# Patient Record
Sex: MALE | Race: WHITE | NOT HISPANIC OR LATINO | Employment: FULL TIME | URBAN - NONMETROPOLITAN AREA
[De-identification: names, ages, dates, MRNs, and addresses within clinical notes are randomized per-mention and may not be internally consistent; named-entity substitution may affect disease eponyms.]

---

## 2023-06-13 PROBLEM — M17.12 ARTHRITIS OF KNEE, LEFT: Status: RESOLVED | Noted: 2023-06-13 | Resolved: 2023-06-13

## 2023-06-13 PROBLEM — I10 HYPERTENSION: Status: ACTIVE | Noted: 2023-06-13
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
## 2023-06-13 PROBLEM — H40.9 GLAUCOMA: Status: RESOLVED | Noted: 2023-06-13 | Resolved: 2023-06-13

## 2023-06-13 PROBLEM — E78.5 HYPERLIPIDEMIA: Status: ACTIVE | Noted: 2023-06-13

## 2023-06-13 RX ORDER — BENAZEPRIL HYDROCHLORIDE 40 MG/1
1 TABLET ORAL DAILY
COMMUNITY
Start: 2013-11-04 | End: 2023-07-27 | Stop reason: SDUPTHER

## 2023-06-13 RX ORDER — AMLODIPINE BESYLATE 10 MG/1
1 TABLET ORAL DAILY
COMMUNITY
Start: 2013-11-04 | End: 2023-07-27 | Stop reason: SDUPTHER

## 2023-07-27 DIAGNOSIS — I10 HYPERTENSION, UNSPECIFIED TYPE: ICD-10-CM

## 2023-07-27 RX ORDER — BENAZEPRIL HYDROCHLORIDE 40 MG/1
40 TABLET ORAL DAILY
Qty: 90 TABLET | Refills: 1 | Status: SHIPPED | OUTPATIENT
Start: 2023-07-27 | End: 2023-08-01 | Stop reason: SDUPTHER

## 2023-07-27 RX ORDER — AMLODIPINE BESYLATE 10 MG/1
10 TABLET ORAL DAILY
Qty: 90 TABLET | Refills: 1 | Status: SHIPPED | OUTPATIENT
Start: 2023-07-27 | End: 2023-08-01 | Stop reason: SDUPTHER

## 2023-08-01 DIAGNOSIS — I10 HYPERTENSION, UNSPECIFIED TYPE: ICD-10-CM

## 2023-08-01 RX ORDER — AMLODIPINE BESYLATE 10 MG/1
10 TABLET ORAL DAILY
Qty: 90 TABLET | Refills: 1 | Status: SHIPPED | OUTPATIENT
Start: 2023-08-01 | End: 2023-08-15 | Stop reason: SDUPTHER

## 2023-08-01 RX ORDER — BENAZEPRIL HYDROCHLORIDE 40 MG/1
40 TABLET ORAL DAILY
Qty: 90 TABLET | Refills: 1 | Status: SHIPPED | OUTPATIENT
Start: 2023-08-01 | End: 2023-08-15 | Stop reason: SDUPTHER

## 2023-08-15 DIAGNOSIS — I10 HYPERTENSION, UNSPECIFIED TYPE: ICD-10-CM

## 2023-08-15 RX ORDER — BENAZEPRIL HYDROCHLORIDE 40 MG/1
40 TABLET ORAL DAILY
Qty: 90 TABLET | Refills: 0 | Status: SHIPPED | OUTPATIENT
Start: 2023-08-15 | End: 2023-10-10

## 2023-08-15 RX ORDER — AMLODIPINE BESYLATE 10 MG/1
10 TABLET ORAL DAILY
Qty: 90 TABLET | Refills: 0 | Status: SHIPPED | OUTPATIENT
Start: 2023-08-15 | End: 2023-10-10

## 2023-09-12 ENCOUNTER — OFFICE VISIT (OUTPATIENT)
Dept: PRIMARY CARE | Facility: CLINIC | Age: 68
End: 2023-09-12
Payer: MEDICARE

## 2023-09-12 VITALS
DIASTOLIC BLOOD PRESSURE: 70 MMHG | HEART RATE: 87 BPM | WEIGHT: 207 LBS | HEIGHT: 71 IN | OXYGEN SATURATION: 94 % | BODY MASS INDEX: 28.98 KG/M2 | SYSTOLIC BLOOD PRESSURE: 110 MMHG | TEMPERATURE: 97.2 F

## 2023-09-12 DIAGNOSIS — Z12.5 SCREENING FOR PROSTATE CANCER: ICD-10-CM

## 2023-09-12 DIAGNOSIS — Z00.00 MEDICARE ANNUAL WELLNESS VISIT, SUBSEQUENT: Primary | ICD-10-CM

## 2023-09-12 DIAGNOSIS — I10 HYPERTENSION, UNSPECIFIED TYPE: ICD-10-CM

## 2023-09-12 DIAGNOSIS — Z23 IMMUNIZATION DUE: ICD-10-CM

## 2023-09-12 DIAGNOSIS — E78.5 HYPERLIPIDEMIA, UNSPECIFIED HYPERLIPIDEMIA TYPE: ICD-10-CM

## 2023-09-12 DIAGNOSIS — Z12.11 SPECIAL SCREENING FOR MALIGNANT NEOPLASM OF COLON: ICD-10-CM

## 2023-09-12 PROCEDURE — G0008 ADMIN INFLUENZA VIRUS VAC: HCPCS | Performed by: FAMILY MEDICINE

## 2023-09-12 PROCEDURE — 1159F MED LIST DOCD IN RCRD: CPT | Performed by: FAMILY MEDICINE

## 2023-09-12 PROCEDURE — 3074F SYST BP LT 130 MM HG: CPT | Performed by: FAMILY MEDICINE

## 2023-09-12 PROCEDURE — 3008F BODY MASS INDEX DOCD: CPT | Performed by: FAMILY MEDICINE

## 2023-09-12 PROCEDURE — 1170F FXNL STATUS ASSESSED: CPT | Performed by: FAMILY MEDICINE

## 2023-09-12 PROCEDURE — G0439 PPPS, SUBSEQ VISIT: HCPCS | Performed by: FAMILY MEDICINE

## 2023-09-12 PROCEDURE — 1160F RVW MEDS BY RX/DR IN RCRD: CPT | Performed by: FAMILY MEDICINE

## 2023-09-12 PROCEDURE — 1036F TOBACCO NON-USER: CPT | Performed by: FAMILY MEDICINE

## 2023-09-12 PROCEDURE — 3078F DIAST BP <80 MM HG: CPT | Performed by: FAMILY MEDICINE

## 2023-09-12 PROCEDURE — 90662 IIV NO PRSV INCREASED AG IM: CPT | Performed by: FAMILY MEDICINE

## 2023-09-12 ASSESSMENT — ACTIVITIES OF DAILY LIVING (ADL)
TAKING_MEDICATION: INDEPENDENT
DOING_HOUSEWORK: INDEPENDENT
BATHING: INDEPENDENT
MANAGING_FINANCES: INDEPENDENT
GROCERY_SHOPPING: INDEPENDENT
DRESSING: INDEPENDENT

## 2023-09-12 ASSESSMENT — PATIENT HEALTH QUESTIONNAIRE - PHQ9
2. FEELING DOWN, DEPRESSED OR HOPELESS: NOT AT ALL
SUM OF ALL RESPONSES TO PHQ9 QUESTIONS 1 AND 2: 0
1. LITTLE INTEREST OR PLEASURE IN DOING THINGS: NOT AT ALL

## 2023-09-12 ASSESSMENT — ENCOUNTER SYMPTOMS
OCCASIONAL FEELINGS OF UNSTEADINESS: 0
DEPRESSION: 0
LOSS OF SENSATION IN FEET: 0

## 2023-09-12 NOTE — PROGRESS NOTES
"Subjective   Reason for Visit: Edward Andrade is an 68 y.o. male here for a Medicare Wellness visit.     Past Medical, Surgical, and Family History reviewed and updated in chart.    Reviewed all medications by prescribing practitioner or clinical pharmacist (such as prescriptions, OTCs, herbal therapies and supplements) and documented in the medical record.    HPI  Here for medicare annual visit  HTN under control with meds  Has not yet gotten his blood work done  Had FIT test done last year. Discussed cologuard      Patient Care Team:  Nicole Coronado MD as PCP - General  Nicole Coronado MD as PCP - [employer] PCP     Review of Systems  General: no fever  Eyes: no blurry vision  ENT: no sore throat, no ear pain  Resp: no cough, sob or wheezing  Cardio: no chest pain, no palpitations  Abd: no nausea/vomiting  : no dysuria, no increased urinary frequency      Objective   Vitals:  /70   Pulse 87   Temp 36.2 °C (97.2 °F)   Ht 1.803 m (5' 11\")   Wt 93.9 kg (207 lb)   SpO2 94%   BMI 28.87 kg/m²       Physical Exam  Gen: NAD, alert  Head: normocephalic/atraumatic  Eyes: conjunctivae normal  Ears: canals clear bilaterally, TM normal   Nose: external nose normal   Oropharynx: clear   Resp: Clear to auscultation  CVS: Regular rate and rhythm  Abdomen: soft, NT, ND  Ext: no edema, NT of lower extremities  Neuro: gait normal     Assessment/Plan   Problem List Items Addressed This Visit       Hyperlipidemia    Relevant Orders    Lipid Panel    TSH with reflex to Free T4 if abnormal    CBC    Comprehensive Metabolic Panel    CT cardiac scoring wo IV contrast    Hypertension    Relevant Orders    CT cardiac scoring wo IV contrast     Other Visit Diagnoses       Medicare annual wellness visit, subsequent    -  Primary    BMI 28.0-28.9,adult        Screening for prostate cancer        Relevant Orders    Prostate Specific Antigen, Screen    Immunization due        Relevant Orders "    Flu vaccine, quadrivalent, high-dose, preservative free, age 65y+ (FLUZONE) (Completed)    Special screening for malignant neoplasm of colon        Relevant Orders    Cologuard® colon cancer screening

## 2023-09-12 NOTE — PATIENT INSTRUCTIONS
Please follow up in 6 months  Please take your medications as prescribed  Please get your blood work and CT cardiac scoring done  Please call the number that was given to you for the cologuard company to make sure it's covered by your insurance and if it is please do complete it

## 2023-09-27 ENCOUNTER — LAB (OUTPATIENT)
Dept: LAB | Facility: LAB | Age: 68
End: 2023-09-27
Payer: MEDICARE

## 2023-09-27 DIAGNOSIS — E78.5 HYPERLIPIDEMIA, UNSPECIFIED HYPERLIPIDEMIA TYPE: ICD-10-CM

## 2023-09-27 DIAGNOSIS — Z12.5 SCREENING FOR PROSTATE CANCER: ICD-10-CM

## 2023-09-27 LAB
ALANINE AMINOTRANSFERASE (SGPT) (U/L) IN SER/PLAS: 19 U/L (ref 10–52)
ALBUMIN (G/DL) IN SER/PLAS: 3.9 G/DL (ref 3.4–5)
ALKALINE PHOSPHATASE (U/L) IN SER/PLAS: 59 U/L (ref 33–136)
ANION GAP IN SER/PLAS: 14 MMOL/L (ref 10–20)
ASPARTATE AMINOTRANSFERASE (SGOT) (U/L) IN SER/PLAS: 15 U/L (ref 9–39)
BILIRUBIN TOTAL (MG/DL) IN SER/PLAS: 0.5 MG/DL (ref 0–1.2)
CALCIUM (MG/DL) IN SER/PLAS: 9 MG/DL (ref 8.6–10.3)
CARBON DIOXIDE, TOTAL (MMOL/L) IN SER/PLAS: 27 MMOL/L (ref 21–32)
CHLORIDE (MMOL/L) IN SER/PLAS: 103 MMOL/L (ref 98–107)
CHOLESTEROL (MG/DL) IN SER/PLAS: 235 MG/DL (ref 0–199)
CHOLESTEROL IN HDL (MG/DL) IN SER/PLAS: 76.4 MG/DL
CHOLESTEROL/HDL RATIO: 3.1
CREATININE (MG/DL) IN SER/PLAS: 0.89 MG/DL (ref 0.5–1.3)
ERYTHROCYTE DISTRIBUTION WIDTH (RATIO) BY AUTOMATED COUNT: 13 % (ref 11.5–14.5)
ERYTHROCYTE MEAN CORPUSCULAR HEMOGLOBIN CONCENTRATION (G/DL) BY AUTOMATED: 32.6 G/DL (ref 32–36)
ERYTHROCYTE MEAN CORPUSCULAR VOLUME (FL) BY AUTOMATED COUNT: 100 FL (ref 80–100)
ERYTHROCYTES (10*6/UL) IN BLOOD BY AUTOMATED COUNT: 4.9 X10E12/L (ref 4.5–5.9)
GFR MALE: >90 ML/MIN/1.73M2
GLUCOSE (MG/DL) IN SER/PLAS: 92 MG/DL (ref 74–99)
HEMATOCRIT (%) IN BLOOD BY AUTOMATED COUNT: 48.8 % (ref 41–52)
HEMOGLOBIN (G/DL) IN BLOOD: 15.9 G/DL (ref 13.5–17.5)
LDL: 147 MG/DL (ref 0–99)
LEUKOCYTES (10*3/UL) IN BLOOD BY AUTOMATED COUNT: 6.9 X10E9/L (ref 4.4–11.3)
PLATELETS (10*3/UL) IN BLOOD AUTOMATED COUNT: 339 X10E9/L (ref 150–450)
POTASSIUM (MMOL/L) IN SER/PLAS: 4.5 MMOL/L (ref 3.5–5.3)
PROTEIN TOTAL: 7.4 G/DL (ref 6.4–8.2)
SODIUM (MMOL/L) IN SER/PLAS: 139 MMOL/L (ref 136–145)
THYROTROPIN (MIU/L) IN SER/PLAS BY DETECTION LIMIT <= 0.05 MIU/L: 2.09 MIU/L (ref 0.44–3.98)
TRIGLYCERIDE (MG/DL) IN SER/PLAS: 57 MG/DL (ref 0–149)
UREA NITROGEN (MG/DL) IN SER/PLAS: 13 MG/DL (ref 6–23)
VLDL: 11 MG/DL (ref 0–40)

## 2023-09-27 PROCEDURE — 36415 COLL VENOUS BLD VENIPUNCTURE: CPT

## 2023-09-27 PROCEDURE — G0103 PSA SCREENING: HCPCS

## 2023-09-27 PROCEDURE — 85027 COMPLETE CBC AUTOMATED: CPT

## 2023-09-27 PROCEDURE — 84443 ASSAY THYROID STIM HORMONE: CPT

## 2023-09-27 PROCEDURE — 80061 LIPID PANEL: CPT

## 2023-09-27 PROCEDURE — 80053 COMPREHEN METABOLIC PANEL: CPT

## 2023-09-28 DIAGNOSIS — E78.5 HYPERLIPIDEMIA, UNSPECIFIED HYPERLIPIDEMIA TYPE: Primary | ICD-10-CM

## 2023-09-28 LAB — PROSTATE SPECIFIC ANTIGEN,SCREEN: 1.18 NG/ML (ref 0–4)

## 2023-09-28 RX ORDER — PRAVASTATIN SODIUM 10 MG/1
10 TABLET ORAL DAILY
Qty: 90 TABLET | Refills: 3 | Status: SHIPPED | OUTPATIENT
Start: 2023-09-28

## 2023-10-10 DIAGNOSIS — I10 HYPERTENSION, UNSPECIFIED TYPE: ICD-10-CM

## 2023-10-10 RX ORDER — BENAZEPRIL HYDROCHLORIDE 40 MG/1
40 TABLET ORAL DAILY
Qty: 90 TABLET | Refills: 3 | Status: SHIPPED | OUTPATIENT
Start: 2023-10-10

## 2023-10-10 RX ORDER — AMLODIPINE BESYLATE 10 MG/1
10 TABLET ORAL DAILY
Qty: 90 TABLET | Refills: 3 | Status: SHIPPED | OUTPATIENT
Start: 2023-10-10

## 2024-03-12 ENCOUNTER — OFFICE VISIT (OUTPATIENT)
Dept: PRIMARY CARE | Facility: CLINIC | Age: 69
End: 2024-03-12
Payer: MEDICARE

## 2024-03-12 VITALS
OXYGEN SATURATION: 97 % | HEIGHT: 72 IN | SYSTOLIC BLOOD PRESSURE: 106 MMHG | DIASTOLIC BLOOD PRESSURE: 71 MMHG | BODY MASS INDEX: 29.23 KG/M2 | HEART RATE: 94 BPM | TEMPERATURE: 97 F | WEIGHT: 215.8 LBS

## 2024-03-12 DIAGNOSIS — Z00.00 MEDICARE ANNUAL WELLNESS VISIT, SUBSEQUENT: Primary | ICD-10-CM

## 2024-03-12 DIAGNOSIS — E78.5 HYPERLIPIDEMIA, UNSPECIFIED HYPERLIPIDEMIA TYPE: ICD-10-CM

## 2024-03-12 DIAGNOSIS — I10 HYPERTENSION, UNSPECIFIED TYPE: ICD-10-CM

## 2024-03-12 DIAGNOSIS — Z12.5 SCREENING FOR PROSTATE CANCER: ICD-10-CM

## 2024-03-12 PROCEDURE — 3008F BODY MASS INDEX DOCD: CPT | Performed by: FAMILY MEDICINE

## 2024-03-12 PROCEDURE — 3078F DIAST BP <80 MM HG: CPT | Performed by: FAMILY MEDICINE

## 2024-03-12 PROCEDURE — 1036F TOBACCO NON-USER: CPT | Performed by: FAMILY MEDICINE

## 2024-03-12 PROCEDURE — 1124F ACP DISCUSS-NO DSCNMKR DOCD: CPT | Performed by: FAMILY MEDICINE

## 2024-03-12 PROCEDURE — 1160F RVW MEDS BY RX/DR IN RCRD: CPT | Performed by: FAMILY MEDICINE

## 2024-03-12 PROCEDURE — 1159F MED LIST DOCD IN RCRD: CPT | Performed by: FAMILY MEDICINE

## 2024-03-12 PROCEDURE — 3074F SYST BP LT 130 MM HG: CPT | Performed by: FAMILY MEDICINE

## 2024-03-12 PROCEDURE — 1170F FXNL STATUS ASSESSED: CPT | Performed by: FAMILY MEDICINE

## 2024-03-12 PROCEDURE — G0439 PPPS, SUBSEQ VISIT: HCPCS | Performed by: FAMILY MEDICINE

## 2024-03-12 ASSESSMENT — ACTIVITIES OF DAILY LIVING (ADL)
GROCERY_SHOPPING: INDEPENDENT
DRESSING: INDEPENDENT
BATHING: INDEPENDENT
MANAGING_FINANCES: INDEPENDENT
DOING_HOUSEWORK: INDEPENDENT
TAKING_MEDICATION: INDEPENDENT

## 2024-03-12 ASSESSMENT — PATIENT HEALTH QUESTIONNAIRE - PHQ9
1. LITTLE INTEREST OR PLEASURE IN DOING THINGS: NOT AT ALL
SUM OF ALL RESPONSES TO PHQ9 QUESTIONS 1 AND 2: 0
2. FEELING DOWN, DEPRESSED OR HOPELESS: NOT AT ALL

## 2024-03-12 ASSESSMENT — ENCOUNTER SYMPTOMS
LOSS OF SENSATION IN FEET: 0
DEPRESSION: 0
OCCASIONAL FEELINGS OF UNSTEADINESS: 0

## 2024-03-12 NOTE — PROGRESS NOTES
"Subjective   Reason for Visit: Edward Andrade is an 69 y.o. male here for a Medicare Wellness visit.     Past Medical, Surgical, and Family History reviewed and updated in chart.    Reviewed all medications by prescribing practitioner or clinical pharmacist (such as prescriptions, OTCs, herbal therapies and supplements) and documented in the medical record.    HPI  Here for medicare annual visit   HTN under control with meds, no issue  Has not yet done his CT cardiac scoring  Has HLD, compliant with pravastatin    Patient Care Team:  Nicole Coronado MD as PCP - General  Nicole Coronado MD as PCP - [employer] PCP     Review of Systems  General: no fever  Eyes: no blurry vision  ENT: no sore throat, no ear pain  Resp: no cough, sob or wheezing  Cardio: no chest pain, no palpitations  Abd: no nausea/vomiting  : no dysuria, no increased urinary frequency    Objective   Vitals:  /71   Pulse 94   Temp 36.1 °C (97 °F)   Ht 1.82 m (5' 11.65\")   Wt 97.9 kg (215 lb 12.8 oz)   SpO2 97%   BMI 29.55 kg/m²       Physical Exam  Gen: NAD, alert  Head: normocephalic/atraumatic  Eyes: conjunctivae normal  Ears: canals clear bilaterally, TM normal   Nose: external nose normal   Oropharynx: clear   Resp: Clear to auscultation  CVS: Regular rate and rhythm  Abdomen: soft, NT, ND  Ext: no edema, NT of lower extremities  Neuro: gait normal     Assessment/Plan   Problem List Items Addressed This Visit       Hyperlipidemia    Relevant Orders    Lipid Panel    CBC    Comprehensive Metabolic Panel    Hypertension  Continue current regimen     Other Visit Diagnoses       Medicare annual wellness visit, subsequent    -  Primary    BMI 29.0-29.9,adult        Screening for prostate cancer        Relevant Orders    Prostate Specific Antigen, Screen               "

## 2024-07-29 ENCOUNTER — TELEMEDICINE (OUTPATIENT)
Dept: PRIMARY CARE | Facility: CLINIC | Age: 69
End: 2024-07-29
Payer: MEDICARE

## 2024-07-29 ENCOUNTER — TELEPHONE (OUTPATIENT)
Dept: PRIMARY CARE | Facility: CLINIC | Age: 69
End: 2024-07-29
Payer: MEDICARE

## 2024-07-29 DIAGNOSIS — S76.012A HIP STRAIN, LEFT, INITIAL ENCOUNTER: Primary | ICD-10-CM

## 2024-07-29 PROCEDURE — 99441 PR PHYS/QHP TELEPHONE EVALUATION 5-10 MIN: CPT | Performed by: FAMILY MEDICINE

## 2024-07-29 RX ORDER — PREDNISONE 20 MG/1
TABLET ORAL
Qty: 18 TABLET | Refills: 0 | Status: SHIPPED | OUTPATIENT
Start: 2024-07-29

## 2024-07-29 NOTE — PROGRESS NOTES
Subjective   Patient ID: Edward Andrade is a 69 y.o. male who presents for left hip pain     HPI  Telephone visit    A telephone visit (audio only) between the patient (at the originating site) and the provider (at the distant site) was utilized to provide this telehealth service.   Verbal consent was requested and obtained from Edward Andrade on this date, 07/29/24 for a telehealth visit.     Spent 6 minutes on medical discussion    Patient has been having left hip pain for 5 days since he was working out and was doing leg weights. Patient denies any other trauma. Taking Aleve which helps but is short lived. Difficulty walking. No swelling. No numbness/tingling.     Review of Systems  As per HPI      Vitals:   due to telehealth visit, vitals not obtained, patient did not have them available at the time of the visit    Objective   Physical Exam  physical exam could not be performed due to telephone visit    Assessment/Plan   Problem List Items Addressed This Visit    None  Visit Diagnoses       Hip strain, left, initial encounter    -  Primary    Relevant Medications    predniSONE (Deltasone) 20 mg tablet

## 2024-07-29 NOTE — TELEPHONE ENCOUNTER
Has hip strain, aleve is helping but was told that it could affect his bp.  He has been monitoring bp and it is 117/75 with pulse of 65.  Is there anything better he can do?  He would like pain medication.  Biofreeze didn't help.  Relief with heat, but all short lived.

## 2024-07-31 ENCOUNTER — TELEPHONE (OUTPATIENT)
Dept: PRIMARY CARE | Facility: CLINIC | Age: 69
End: 2024-07-31
Payer: MEDICARE

## 2024-07-31 NOTE — TELEPHONE ENCOUNTER
Pt taking prednisone, worked great When sitting and lying down no pain at all, walking the pain is better, felt you should know

## 2024-08-08 ENCOUNTER — TELEPHONE (OUTPATIENT)
Dept: PRIMARY CARE | Facility: CLINIC | Age: 69
End: 2024-08-08
Payer: MEDICARE

## 2024-08-08 DIAGNOSIS — S76.012A HIP STRAIN, LEFT, INITIAL ENCOUNTER: ICD-10-CM

## 2024-08-08 RX ORDER — PREDNISONE 20 MG/1
TABLET ORAL
Qty: 18 TABLET | Refills: 0 | Status: CANCELLED | OUTPATIENT
Start: 2024-08-08

## 2024-08-08 NOTE — TELEPHONE ENCOUNTER
Patient called for update pain is unbearable at times,using heating pad and Biofreeze. Wondering if you could give a pain pill little stiff yet. Prednisone seems to be working no red no pain when sitting does have a high tolerance to pain but this is unbearable at times

## 2024-08-09 DIAGNOSIS — S76.012A HIP STRAIN, LEFT, INITIAL ENCOUNTER: Primary | ICD-10-CM

## 2024-08-09 RX ORDER — DICLOFENAC SODIUM 75 MG/1
75 TABLET, DELAYED RELEASE ORAL 2 TIMES DAILY PRN
Qty: 60 TABLET | Refills: 0 | Status: SHIPPED | OUTPATIENT
Start: 2024-08-09 | End: 2024-09-08

## 2024-08-14 ENCOUNTER — TELEPHONE (OUTPATIENT)
Dept: PRIMARY CARE | Facility: CLINIC | Age: 69
End: 2024-08-14
Payer: MEDICARE

## 2024-08-14 NOTE — TELEPHONE ENCOUNTER
What is the time frame for hip strain to heal?  It feels better during the day, but hurts at night.  He is also concerned with the heart attack side effect of the voltaren.  He does not have any symptoms from the medication, just alarmed at the side effects.  He is also afraid of aggrevating it.  He wants to know what is too much to do?

## 2024-08-16 ENCOUNTER — HOSPITAL ENCOUNTER (OUTPATIENT)
Dept: RADIOLOGY | Facility: HOSPITAL | Age: 69
Discharge: HOME | End: 2024-08-16
Payer: MEDICARE

## 2024-08-16 ENCOUNTER — OFFICE VISIT (OUTPATIENT)
Dept: PRIMARY CARE | Facility: CLINIC | Age: 69
End: 2024-08-16
Payer: MEDICARE

## 2024-08-16 VITALS
OXYGEN SATURATION: 96 % | SYSTOLIC BLOOD PRESSURE: 95 MMHG | BODY MASS INDEX: 28.31 KG/M2 | HEART RATE: 105 BPM | WEIGHT: 209 LBS | HEIGHT: 72 IN | TEMPERATURE: 97 F | DIASTOLIC BLOOD PRESSURE: 64 MMHG

## 2024-08-16 DIAGNOSIS — M25.552 LEFT HIP PAIN: ICD-10-CM

## 2024-08-16 DIAGNOSIS — I10 ESSENTIAL HYPERTENSION: Primary | ICD-10-CM

## 2024-08-16 PROCEDURE — 73502 X-RAY EXAM HIP UNI 2-3 VIEWS: CPT | Mod: LT

## 2024-08-16 RX ORDER — NAPROXEN 500 MG/1
500 TABLET ORAL 2 TIMES DAILY PRN
Qty: 60 TABLET | Refills: 5 | Status: SHIPPED | OUTPATIENT
Start: 2024-08-16 | End: 2025-04-13

## 2024-08-16 RX ORDER — KETOROLAC TROMETHAMINE 30 MG/ML
30 INJECTION, SOLUTION INTRAMUSCULAR; INTRAVENOUS ONCE
Status: COMPLETED | OUTPATIENT
Start: 2024-08-16 | End: 2024-08-16

## 2024-08-16 RX ORDER — AMLODIPINE BESYLATE 5 MG/1
5 TABLET ORAL DAILY
Qty: 30 TABLET | Refills: 5 | Status: SHIPPED | OUTPATIENT
Start: 2024-08-16 | End: 2025-02-12

## 2024-08-16 ASSESSMENT — ENCOUNTER SYMPTOMS
LOSS OF SENSATION IN FEET: 0
DEPRESSION: 0
OCCASIONAL FEELINGS OF UNSTEADINESS: 0

## 2024-08-16 NOTE — PROGRESS NOTES
"Subjective   Patient ID: Edward Andrade is a 69 y.o. male who presents for Hip Pain (Pain in Left hip. Going on for 19 days (stretching)).    HPI  Here for urgent visit  Still having pain in his left hip after he was working out doing leg lifts 3 weeks ago. Some days better than others. Some days can't even get out of the couch because the pain is so bad. Prednisone, voltaren didn't help  Bp low today, will cut down on norvasc from 10mg to 5mg, recheck in 2 weeks    Review of Systems  General: no fever  Eyes: no blurry vision  ENT: no sore throat, no ear pain  Resp: no cough, sob or wheezing  Cardio: no chest pain, no palpitations  Abd: no nausea/vomiting  : no dysuria, no increased urinary frequency      BP 95/64   Pulse 105   Temp 36.1 °C (97 °F)   Ht 1.82 m (5' 11.65\")   Wt 94.8 kg (209 lb)   SpO2 96%   BMI 28.62 kg/m²       Objective   Physical Exam  Gen: NAD, alert  Head: normocephalic/atraumatic  Eyes: conjunctivae normal  Ears: canals clear bilaterally, TM normal   Nose: external nose normal   Oropharynx: clear   Resp: Clear to auscultation  CVS: Regular rate and rhythm  Abdomen: soft, NT, ND  Ext: no edema, NT of lower extremities       Assessment/Plan   Problem List Items Addressed This Visit    None  Visit Diagnoses       Essential hypertension    -  Primary    Relevant Medications    amLODIPine (Norvasc) 5 mg tablet    Left hip pain        Relevant Medications    ketorolac (Toradol) injection 30 mg (Completed) (Start on 8/16/2024 11:45 AM)    naproxen (Naprosyn) 500 mg tablet    Other Relevant Orders    Referral to Orthopaedic Surgery    Referral to Physical Therapy    XR hip left with pelvis when performed 2 or 3 views               "

## 2024-08-29 ENCOUNTER — EVALUATION (OUTPATIENT)
Dept: PHYSICAL THERAPY | Facility: HOSPITAL | Age: 69
End: 2024-08-29
Payer: MEDICARE

## 2024-08-29 DIAGNOSIS — M25.552 LEFT HIP PAIN: ICD-10-CM

## 2024-08-29 PROCEDURE — 97161 PT EVAL LOW COMPLEX 20 MIN: CPT | Mod: GP

## 2024-08-29 ASSESSMENT — COLUMBIA-SUICIDE SEVERITY RATING SCALE - C-SSRS
1. IN THE PAST MONTH, HAVE YOU WISHED YOU WERE DEAD OR WISHED YOU COULD GO TO SLEEP AND NOT WAKE UP?: NO
2. HAVE YOU ACTUALLY HAD ANY THOUGHTS OF KILLING YOURSELF?: NO
6. HAVE YOU EVER DONE ANYTHING, STARTED TO DO ANYTHING, OR PREPARED TO DO ANYTHING TO END YOUR LIFE?: NO

## 2024-08-29 ASSESSMENT — ENCOUNTER SYMPTOMS
DEPRESSION: 0
LOSS OF SENSATION IN FEET: 0
OCCASIONAL FEELINGS OF UNSTEADINESS: 0

## 2024-08-29 ASSESSMENT — PATIENT HEALTH QUESTIONNAIRE - PHQ9
1. LITTLE INTEREST OR PLEASURE IN DOING THINGS: NOT AT ALL
2. FEELING DOWN, DEPRESSED OR HOPELESS: NOT AT ALL
SUM OF ALL RESPONSES TO PHQ9 QUESTIONS 1 AND 2: 0

## 2024-08-29 ASSESSMENT — PAIN SCALES - GENERAL: PAINLEVEL_OUTOF10: 0 - NO PAIN

## 2024-08-29 ASSESSMENT — PAIN - FUNCTIONAL ASSESSMENT: PAIN_FUNCTIONAL_ASSESSMENT: 0-10

## 2024-08-29 NOTE — PROGRESS NOTES
Called [phone] Physical Therapy    Physical Therapy Evaluation and Treatment      Patient Name: Edward Andrade  MRN: [id number]  Today's Date: 8/29/2024    Time Entry:   Time Calculation  Start Time: 1300  Stop Time: 1334  Time Calculation (min): 34 min  PT Evaluation Time Entry  PT Evaluation (Low) Time Entry: 30  PT Therapeutic Procedures Time Entry  Therapeutic Exercise Time Entry: 4     Assessment:  PT Assessment  Assessment Comment: Patient presents with resolving L anterior hiip strain. He requested HEP. Patient instructed in /performed stretches without pain and with good technique. He will continue with these as HEP and will slowly restart his personal fitness activities.     Plan:  OP PT Plan  Treatment/Interventions: Therapeutic exercises  PT Plan: Initial Assessment and Treatment only  PT Frequency: One time visit  Certification Period Start Date: 08/29/24  Certification Period End Date: 08/29/24    Current Problem:   1. Left hip pain  Referral to Physical Therapy          Subjective    General:  General  Reason for Referral: Eval and treat 2/2 L hip pain.  Referred By: Dr. Douglas  Past Medical History Relevant to Rehab: Patient reports straining L hip about 1 month ago. He has had ups and downs but states that today and over last week he has been better with no pain today. He would like some exercises for home use.    Precautions:  Precautions  STEADI Fall Risk Score (The score of 4 or more indicates an increased risk of falling): 0    Pain:  Pain Assessment  Pain Assessment: 0-10  0-10 (Numeric) Pain Score: 0 - No pain    Objective     General Assessments:   Range of Motion Comments: No AROM limitation noted LLE    Strength Comments: 5/5 on MMT of hip girdle    Flexibility Comment: Mild anterior hip tightness noted.    Palpation Comment: NTTP   Special Tests Comment: Fabers, scour, circumduction negative.    Functional Assessments:  Gait Comment: Non antalgic gait without devices.   and Balance Comment: Unilateral  stance without pain.    Extremity/Trunk Assessments:     HIP    Functional Rating Scale     Observation  No abnormality noted     Hip Palpation/Joint Mobility      Lumbar AROM  WNL     Hip AROM  WNL bilateral hip AROM.     Specific Lower Extremity MMT  5/5      Gait  Non antalgic gait     Outcome Measures:  Patient unable to fill out 2/2 just having eyedrops.      Treatments:  Therapeutic Exercise  Therapeutic Exercise Performed: Yes  Therapeutic Exercise Activity 1: hip flexion stretch in supine 3x30 sec  Therapeutic Exercise Activity 2: anterior hip/rectus stretch 3x30 sec.    Access Code: [email or web address]  URL: [email or web address]  Date: 08/29/2024  Prepared by: Mane Patel    Exercises  - Standing Quad Stretch with Table and Chair Support  - 1 x daily - 7 x weekly - 3 sets - 1 reps - 30 hold  - Supine Single Knee to Chest Stretch  - 1 x daily - 7 x weekly - 3 sets - 1 reps - 30 hold    EDUCATION:  Outpatient Education  Individual(s) Educated: Patient  Education Provided: Anatomy, Body Mechanics, Home Exercise Program (See below.)  Patient Response to Education: Patient/Caregiver Verbalized Understanding of Information           Screening  Frequency  Date Last Completed   Spiritual and Cultural Beliefs   Screening  each visit or episode of care 8/29/2024   Falls Risk Screening  every ambulatory visit [unfilled]   Pain Screening  annually at primary care visit     Domestic Violence screening  annually at primary care visit    Elder Abuse Screening  annually at primary care visit    Depression Screening  annually in the primary care setting 8/29/2024   Suicide Risk Screening  annually in the primary care setting 8/29/2024   Nutrition and Food Insecurity   Screening  at least annually at primary care visit     Key Learner  annually in the primary care setting 8/29/2024   Drug Screen  8/16/2024 11:08 AM   Alcohol Screen  8/16/2024 11:08 AM   Advance Directive  3/12/2024        Goals:  Resolved        PT Problem       The patient will demonstrate full understanding and competent performance of their home exercise program to maintain or potentially further improve their presenting condition.  (Met)       Start:  08/29/24    Expected End:  08/29/24    Resolved:  08/29/24

## 2024-08-29 NOTE — LETTER
August 29, 2024    Mane Patel, PT  [address]  Rehab Services  [facility] [address]    Patient: Edward Andrade   YOB: 1955   Date of Visit: 8/29/2024       Dear Nicole Coronado MD  [address] [place] [address]    The attached plan of care is being sent to you because your patient’s medical reimbursement requires that you certify the plan of care. Your signature is required to allow uninterrupted insurance coverage.      You may indicate your approval by signing below and faxing this form back to us at Dept Fax: [phone].    Please call Dept: [phone] with any questions or concerns.    Thank you for this referral,        Mane Patel PT  [facility]  [address] [id number] [phone]    Payer: Payor: [employer] MEDICARE / Plan: [employer] MEDICARE / Product Type: *No Product type* /                                                                         Date:     Dear Mane Patel PT,     Re: Mr. Edward Andrade, MRN:[id number]    I certify that I have reviewed the attached plan of care and it is medically necessary for Mr. Edward Andrade (1955) who is under my care.          ______________________________________                    _________________  Provider name and credentials                                           Date and time                                                                                           Plan of Care 8/29/24   Effective from: 8/29/2024  Effective to: 8/29/2024    Plan ID: [id number]            Participants as of Finalize on 8/29/2024    Name Type Comments Contact Info    Nicole Coronado MD PCP - [employer] PCP  [phone]    Mane Patel PT Physical Therapist  [phone]       Last Plan Note     Author: Mane Patel PT Status: Incomplete Last edited: 8/29/2024  1:00 PM       Physical Therapy    Physical  Therapy Evaluation and Treatment      Patient Name: Edward Andrade  MRN: [id number]  Today's Date: 8/29/2024    Time Entry:   Time Calculation  Start Time: 1300  Stop Time: 1334  Time Calculation (min): 34 min  PT Evaluation Time Entry  PT Evaluation (Low) Time Entry: 30  PT Therapeutic Procedures Time Entry  Therapeutic Exercise Time Entry: 4     Assessment:  PT Assessment  Assessment Comment: Patient presents with resolving L anterior hiip strain. He requested HEP. Patient instructed in /performed stretches without pain and with good technique. He will continue with these as HEP and will slowly restart his personal fitness activities.     Plan:  OP PT Plan  Treatment/Interventions: Therapeutic exercises  PT Plan: Initial Assessment and Treatment only  PT Frequency: One time visit  Certification Period Start Date: 08/29/24  Certification Period End Date: 08/29/24    Current Problem:   1. Left hip pain  Referral to Physical Therapy          Subjective   General:  General  Reason for Referral: Eval and treat 2/2 L hip pain.  Referred By: Dr. Douglas  Past Medical History Relevant to Rehab: Patient reports straining L hip about 1 month ago. He has had ups and downs but states that today and over last week he has been better with no pain today. He would like some exercises for home use.    Precautions:  Precautions  STEADI Fall Risk Score (The score of 4 or more indicates an increased risk of falling): 0    Pain:  Pain Assessment  Pain Assessment: 0-10  0-10 (Numeric) Pain Score: 0 - No pain    Objective    General Assessments:   Range of Motion Comments: No AROM limitation noted LLE    Strength Comments: 5/5 on MMT of hip girdle    Flexibility Comment: Mild anterior hip tightness noted.    Palpation Comment: NTTP   Special Tests Comment: Fabers, scour, circumduction negative.    Functional Assessments:  Gait Comment: Non antalgic gait without devices.   and Balance Comment: Unilateral stance without  pain.    Extremity/Trunk Assessments:     HIP    Functional Rating Scale     Observation  No abnormality noted     Hip Palpation/Joint Mobility      Lumbar AROM  WNL     Hip AROM  WNL bilateral hip AROM.     Specific Lower Extremity MMT  5/5      Gait  Non antalgic gait     Outcome Measures:  Patient unable to fill out 2/2 just having eyedrops.      Treatments:  Therapeutic Exercise  Therapeutic Exercise Performed: Yes  Therapeutic Exercise Activity 1: hip flexion stretch in supine 3x30 sec  Therapeutic Exercise Activity 2: anterior hip/rectus stretch 3x30 sec.    Access Code: [email or web address]  URL: [email or web address]  Date: 08/29/2024  Prepared by: Mane Patel    Exercises  - Standing Quad Stretch with Table and Chair Support  - 1 x daily - 7 x weekly - 3 sets - 1 reps - 30 hold  - Supine Single Knee to Chest Stretch  - 1 x daily - 7 x weekly - 3 sets - 1 reps - 30 hold    EDUCATION:  Outpatient Education  Individual(s) Educated: Patient  Education Provided: Anatomy, Body Mechanics, Home Exercise Program (See below.)  Patient Response to Education: Patient/Caregiver Verbalized Understanding of Information    Goals:  Resolved       PT Problem       The patient will demonstrate full understanding and competent performance of their home exercise program to maintain or potentially further improve their presenting condition.  (Met)       Start:  08/29/24    Expected End:  08/29/24    Resolved:  08/29/24                        Current Participants as of 8/29/2024    Name Type Comments Contact Info    Nicole Coronado MD PCP - [employer] PCP  [phone]    Signature pending    Mane Patel, PT Physical Therapist  [phone]    Signature pending

## 2024-09-03 ENCOUNTER — APPOINTMENT (OUTPATIENT)
Dept: PRIMARY CARE | Facility: CLINIC | Age: 69
End: 2024-09-03
Payer: MEDICARE

## 2024-09-15 DIAGNOSIS — I10 HYPERTENSION, UNSPECIFIED TYPE: ICD-10-CM

## 2024-09-16 RX ORDER — BENAZEPRIL HYDROCHLORIDE 40 MG/1
40 TABLET ORAL DAILY
Qty: 90 TABLET | Refills: 3 | Status: ON HOLD | OUTPATIENT
Start: 2024-09-16 | End: 2024-09-21 | Stop reason: ALTCHOICE

## 2024-09-20 ENCOUNTER — HOSPITAL ENCOUNTER (INPATIENT)
Facility: HOSPITAL | Age: 69
End: 2024-09-20
Attending: INTERNAL MEDICINE | Admitting: INTERNAL MEDICINE
Payer: MEDICARE

## 2024-09-20 DIAGNOSIS — H04.129 DRY EYE: ICD-10-CM

## 2024-09-20 DIAGNOSIS — I26.99 BILATERAL PULMONARY EMBOLISM (MULTI): ICD-10-CM

## 2024-09-20 DIAGNOSIS — I48.0 AF (PAROXYSMAL ATRIAL FIBRILLATION) (MULTI): ICD-10-CM

## 2024-09-20 DIAGNOSIS — R53.1 GENERALIZED WEAKNESS: Primary | ICD-10-CM

## 2024-09-20 PROCEDURE — 1900000001 HC SKILLED SWING ROOM

## 2024-09-20 SDOH — SOCIAL STABILITY: SOCIAL INSECURITY: ARE THERE ANY APPARENT SIGNS OF INJURIES/BEHAVIORS THAT COULD BE RELATED TO ABUSE/NEGLECT?: NO

## 2024-09-20 SDOH — SOCIAL STABILITY: SOCIAL INSECURITY: HAS ANYONE EVER THREATENED TO HURT YOUR FAMILY OR YOUR PETS?: NO

## 2024-09-20 SDOH — SOCIAL STABILITY: SOCIAL INSECURITY: WERE YOU ABLE TO COMPLETE ALL THE BEHAVIORAL HEALTH SCREENINGS?: YES

## 2024-09-20 SDOH — SOCIAL STABILITY: SOCIAL INSECURITY: DO YOU FEEL ANYONE HAS EXPLOITED OR TAKEN ADVANTAGE OF YOU FINANCIALLY OR OF YOUR PERSONAL PROPERTY?: NO

## 2024-09-20 SDOH — SOCIAL STABILITY: SOCIAL INSECURITY: ARE YOU OR HAVE YOU BEEN THREATENED OR ABUSED PHYSICALLY, EMOTIONALLY, OR SEXUALLY BY ANYONE?: NO

## 2024-09-20 SDOH — SOCIAL STABILITY: SOCIAL INSECURITY: HAVE YOU HAD THOUGHTS OF HARMING ANYONE ELSE?: NO

## 2024-09-20 SDOH — SOCIAL STABILITY: SOCIAL INSECURITY: ABUSE: ADULT

## 2024-09-20 SDOH — SOCIAL STABILITY: SOCIAL INSECURITY: HAVE YOU HAD ANY THOUGHTS OF HARMING ANYONE ELSE?: NO

## 2024-09-20 SDOH — SOCIAL STABILITY: SOCIAL INSECURITY: DO YOU FEEL UNSAFE GOING BACK TO THE PLACE WHERE YOU ARE LIVING?: NO

## 2024-09-20 SDOH — SOCIAL STABILITY: SOCIAL INSECURITY: DOES ANYONE TRY TO KEEP YOU FROM HAVING/CONTACTING OTHER FRIENDS OR DOING THINGS OUTSIDE YOUR HOME?: NO

## 2024-09-20 ASSESSMENT — PAIN SCALES - GENERAL: PAINLEVEL_OUTOF10: 2

## 2024-09-20 ASSESSMENT — COGNITIVE AND FUNCTIONAL STATUS - GENERAL
DAILY ACTIVITIY SCORE: 19
DRESSING REGULAR LOWER BODY CLOTHING: A LITTLE
MOBILITY SCORE: 17
MOVING TO AND FROM BED TO CHAIR: A LITTLE
MOVING FROM LYING ON BACK TO SITTING ON SIDE OF FLAT BED WITH BEDRAILS: A LITTLE
TURNING FROM BACK TO SIDE WHILE IN FLAT BAD: A LITTLE
CLIMB 3 TO 5 STEPS WITH RAILING: A LOT
DRESSING REGULAR UPPER BODY CLOTHING: A LITTLE
HELP NEEDED FOR BATHING: A LITTLE
STANDING UP FROM CHAIR USING ARMS: A LOT
PATIENT BASELINE BEDBOUND: NO
PERSONAL GROOMING: A LITTLE
TOILETING: A LITTLE

## 2024-09-20 ASSESSMENT — LIFESTYLE VARIABLES
HOW OFTEN DO YOU HAVE 6 OR MORE DRINKS ON ONE OCCASION: MONTHLY
HOW MANY STANDARD DRINKS CONTAINING ALCOHOL DO YOU HAVE ON A TYPICAL DAY: 3 OR 4
HOW OFTEN DO YOU HAVE A DRINK CONTAINING ALCOHOL: MONTHLY OR LESS
SKIP TO QUESTIONS 9-10: 0
AUDIT-C TOTAL SCORE: 4
AUDIT-C TOTAL SCORE: 4

## 2024-09-20 ASSESSMENT — ACTIVITIES OF DAILY LIVING (ADL)
HEARING - LEFT EAR: FUNCTIONAL
BATHING: INDEPENDENT
JUDGMENT_ADEQUATE_SAFELY_COMPLETE_DAILY_ACTIVITIES: YES
LACK_OF_TRANSPORTATION: NO
DRESSING YOURSELF: NEEDS ASSISTANCE
GROOMING: INDEPENDENT
HEARING - RIGHT EAR: FUNCTIONAL
ADEQUATE_TO_COMPLETE_ADL: YES
WALKS IN HOME: INDEPENDENT
PATIENT'S MEMORY ADEQUATE TO SAFELY COMPLETE DAILY ACTIVITIES?: YES
TOILETING: INDEPENDENT
FEEDING YOURSELF: INDEPENDENT

## 2024-09-20 ASSESSMENT — PAIN - FUNCTIONAL ASSESSMENT: PAIN_FUNCTIONAL_ASSESSMENT: 0-10

## 2024-09-20 ASSESSMENT — PATIENT HEALTH QUESTIONNAIRE - PHQ9
SUM OF ALL RESPONSES TO PHQ9 QUESTIONS 1 & 2: 0
1. LITTLE INTEREST OR PLEASURE IN DOING THINGS: NOT AT ALL
2. FEELING DOWN, DEPRESSED OR HOPELESS: NOT AT ALL

## 2024-09-21 PROCEDURE — 97161 PT EVAL LOW COMPLEX 20 MIN: CPT | Mod: GP

## 2024-09-21 PROCEDURE — 2500000001 HC RX 250 WO HCPCS SELF ADMINISTERED DRUGS (ALT 637 FOR MEDICARE OP): Performed by: NURSE PRACTITIONER

## 2024-09-21 PROCEDURE — 97166 OT EVAL MOD COMPLEX 45 MIN: CPT | Mod: GO | Performed by: OCCUPATIONAL THERAPIST

## 2024-09-21 PROCEDURE — 1900000001 HC SKILLED SWING ROOM

## 2024-09-21 PROCEDURE — 97116 GAIT TRAINING THERAPY: CPT | Mod: GP

## 2024-09-21 PROCEDURE — 94760 N-INVAS EAR/PLS OXIMETRY 1: CPT

## 2024-09-21 PROCEDURE — 99304 1ST NF CARE SF/LOW MDM 25: CPT | Performed by: NURSE PRACTITIONER

## 2024-09-21 PROCEDURE — 2500000002 HC RX 250 W HCPCS SELF ADMINISTERED DRUGS (ALT 637 FOR MEDICARE OP, ALT 636 FOR OP/ED): Performed by: NURSE PRACTITIONER

## 2024-09-21 RX ORDER — METOPROLOL TARTRATE 50 MG/1
100 TABLET ORAL 2 TIMES DAILY
Status: DISCONTINUED | OUTPATIENT
Start: 2024-09-21 | End: 2024-09-27 | Stop reason: HOSPADM

## 2024-09-21 RX ORDER — ACETAMINOPHEN 325 MG/1
650 TABLET ORAL EVERY 6 HOURS PRN
Status: DISCONTINUED | OUTPATIENT
Start: 2024-09-21 | End: 2024-09-27 | Stop reason: HOSPADM

## 2024-09-21 RX ORDER — TIMOLOL MALEATE 5 MG/ML
1 SOLUTION/ DROPS OPHTHALMIC 2 TIMES DAILY
Status: DISCONTINUED | OUTPATIENT
Start: 2024-09-21 | End: 2024-09-22 | Stop reason: WASHOUT

## 2024-09-21 RX ORDER — LATANOPROST 50 UG/ML
1 SOLUTION/ DROPS OPHTHALMIC NIGHTLY
Status: DISCONTINUED | OUTPATIENT
Start: 2024-09-21 | End: 2024-09-27 | Stop reason: HOSPADM

## 2024-09-21 RX ORDER — AMIODARONE HYDROCHLORIDE 200 MG/1
200 TABLET ORAL DAILY
Status: ON HOLD | COMMUNITY
End: 2024-09-27

## 2024-09-21 RX ORDER — BRINZOLAMIDE 10 MG/ML
1 SUSPENSION/ DROPS OPHTHALMIC 2 TIMES DAILY
Status: ON HOLD | COMMUNITY
End: 2024-09-22 | Stop reason: ENTERED-IN-ERROR

## 2024-09-21 RX ORDER — OXYCODONE HYDROCHLORIDE 5 MG/1
5 TABLET ORAL EVERY 8 HOURS PRN
COMMUNITY
End: 2024-10-04 | Stop reason: WASHOUT

## 2024-09-21 RX ORDER — AMIODARONE HYDROCHLORIDE 200 MG/1
200 TABLET ORAL DAILY
Status: DISCONTINUED | OUTPATIENT
Start: 2024-09-21 | End: 2024-09-27 | Stop reason: HOSPADM

## 2024-09-21 RX ORDER — TIMOLOL MALEATE 5 MG/ML
1 SOLUTION/ DROPS OPHTHALMIC 2 TIMES DAILY
Status: ON HOLD | COMMUNITY
End: 2024-09-22 | Stop reason: ALTCHOICE

## 2024-09-21 RX ORDER — LATANOPROST 50 UG/ML
1 SOLUTION/ DROPS OPHTHALMIC NIGHTLY
Status: ON HOLD | COMMUNITY
End: 2024-09-22 | Stop reason: ENTERED-IN-ERROR

## 2024-09-21 RX ORDER — OXYCODONE HYDROCHLORIDE 5 MG/1
5 TABLET ORAL EVERY 8 HOURS PRN
Status: DISCONTINUED | OUTPATIENT
Start: 2024-09-21 | End: 2024-09-27 | Stop reason: HOSPADM

## 2024-09-21 RX ORDER — METOPROLOL TARTRATE 100 MG/1
100 TABLET ORAL 2 TIMES DAILY
Status: ON HOLD | COMMUNITY
End: 2024-09-27

## 2024-09-21 RX ORDER — BRIMONIDINE TARTRATE 2 MG/ML
1 SOLUTION/ DROPS OPHTHALMIC NIGHTLY
Status: DISCONTINUED | OUTPATIENT
Start: 2024-09-21 | End: 2024-09-22

## 2024-09-21 RX ORDER — BRINZOLAMIDE 10 MG/ML
1 SUSPENSION/ DROPS OPHTHALMIC 2 TIMES DAILY
Status: DISCONTINUED | OUTPATIENT
Start: 2024-09-21 | End: 2024-09-22 | Stop reason: ALTCHOICE

## 2024-09-21 RX ORDER — BRIMONIDINE TARTRATE 2 MG/ML
1 SOLUTION/ DROPS OPHTHALMIC 2 TIMES DAILY
Status: ON HOLD | COMMUNITY
End: 2024-09-27

## 2024-09-21 ASSESSMENT — COGNITIVE AND FUNCTIONAL STATUS - GENERAL
DRESSING REGULAR LOWER BODY CLOTHING: A LITTLE
DRESSING REGULAR UPPER BODY CLOTHING: A LITTLE
TOILETING: A LITTLE
TURNING FROM BACK TO SIDE WHILE IN FLAT BAD: A LITTLE
PERSONAL GROOMING: A LITTLE
CLIMB 3 TO 5 STEPS WITH RAILING: A LOT
DRESSING REGULAR UPPER BODY CLOTHING: A LITTLE
DRESSING REGULAR LOWER BODY CLOTHING: A LOT
MOBILITY SCORE: 17
CLIMB 3 TO 5 STEPS WITH RAILING: A LOT
HELP NEEDED FOR BATHING: A LITTLE
STANDING UP FROM CHAIR USING ARMS: A LOT
HELP NEEDED FOR BATHING: A LITTLE
MOVING FROM LYING ON BACK TO SITTING ON SIDE OF FLAT BED WITH BEDRAILS: A LITTLE
MOVING TO AND FROM BED TO CHAIR: A LITTLE
MOVING TO AND FROM BED TO CHAIR: A LITTLE
PERSONAL GROOMING: A LITTLE
TOILETING: A LITTLE
TURNING FROM BACK TO SIDE WHILE IN FLAT BAD: A LITTLE
MOVING TO AND FROM BED TO CHAIR: A LITTLE
STANDING UP FROM CHAIR USING ARMS: A LITTLE
PERSONAL GROOMING: A LITTLE
MOBILITY SCORE: 18
WALKING IN HOSPITAL ROOM: A LITTLE
TOILETING: A LITTLE
DAILY ACTIVITIY SCORE: 19
STANDING UP FROM CHAIR USING ARMS: A LOT
DRESSING REGULAR LOWER BODY CLOTHING: A LITTLE
TURNING FROM BACK TO SIDE WHILE IN FLAT BAD: A LITTLE
DRESSING REGULAR UPPER BODY CLOTHING: A LITTLE
HELP NEEDED FOR BATHING: A LITTLE
DAILY ACTIVITIY SCORE: 19
MOVING FROM LYING ON BACK TO SITTING ON SIDE OF FLAT BED WITH BEDRAILS: A LITTLE
CLIMB 3 TO 5 STEPS WITH RAILING: A LOT
DAILY ACTIVITIY SCORE: 18
MOBILITY SCORE: 17

## 2024-09-21 ASSESSMENT — PAIN DESCRIPTION - ORIENTATION: ORIENTATION: RIGHT

## 2024-09-21 ASSESSMENT — PAIN SCALES - GENERAL
PAINLEVEL_OUTOF10: 8
PAINLEVEL_OUTOF10: 2
PAINLEVEL_OUTOF10: 2

## 2024-09-21 ASSESSMENT — PAIN - FUNCTIONAL ASSESSMENT
PAIN_FUNCTIONAL_ASSESSMENT: 0-10
PAIN_FUNCTIONAL_ASSESSMENT: 0-10

## 2024-09-21 ASSESSMENT — PAIN DESCRIPTION - LOCATION: LOCATION: RIB CAGE

## 2024-09-21 ASSESSMENT — ACTIVITIES OF DAILY LIVING (ADL): ADL_ASSISTANCE: INDEPENDENT

## 2024-09-21 NOTE — H&P
History of Present Illness  Edward Andrade is a 69 y.o. male  with PMHx significant for bilateral PE, HLD, HTN, afib RVR, IVC filter who presented to [facility] for swing admission for aggressive therapy. Patient expressed that he was preparing for a camping trip/camping when he suddenly woke up on the floor on his left side. He does not remember the fall or how long he was down really. But on waking, he called his wife and expressed that he didn't feel well. She told him to call 911. He was transported to [place] where he became hypoxic requiring intubation and subsequently required CPR and subsequently revived. He had also develped right pneumo requiring chest tube placement. He suffered fractures of the right ribs X 3 and right clavicle from the fall. He suffered a further fracture during CPR. There is extensive bruising of the right side and an occlusive dressing right rib cage where the chest tube had been removed.     12 Point ROS negative unless noted in above HPI       Past Medical History  Past Medical History:   Diagnosis Date    Anxiety disorder, unspecified 11/05/2020    Anxiety and depression    Arthritis of knee, left 06/13/2023    Glaucoma 06/13/2023    Tick bite 06/13/2023       Surgical History  Past Surgical History:   Procedure Laterality Date    NO PAST SURGERIES          Social History  He reports that he has never smoked. He has never used smokeless tobacco. He reports current alcohol use of about 2.0 standard drinks of alcohol per week. He reports that he does not use drugs.    Allergies  Penicillins     Physical Exam  Constitutional:       Appearance: Normal appearance. He is not ill-appearing.   HENT:      Head: Atraumatic.      Mouth/Throat:      Mouth: Mucous membranes are moist.   Eyes:      Extraocular Movements: Extraocular movements intact.      Pupils: Pupils are equal, round, and reactive to light.   Cardiovascular:      Rate and Rhythm: Normal rate. Rhythm irregular.       "Pulses: Normal pulses.   Pulmonary:      Effort: Pulmonary effort is normal.      Breath sounds: Normal breath sounds.   Abdominal:      General: Bowel sounds are normal.   Musculoskeletal:         General: Normal range of motion.   Skin:     General: Skin is warm and dry.      Capillary Refill: Capillary refill takes less than 2 seconds.   Neurological:      General: No focal deficit present.      Mental Status: He is alert and oriented to person, place, and time.   Psychiatric:         Mood and Affect: Mood normal.         Behavior: Behavior normal.          Last Recorded Vitals  Blood pressure 112/73, pulse 97, temperature 36.3 °C (97.4 °F), temperature source Temporal, resp. rate 20, height 1.82 m (5' 11.65\"), weight 89.7 kg (197 lb 12 oz), SpO2 97%.    Relevant Results  Scheduled medications  amiodarone, 200 mg, oral, Daily  apixaban, 5 mg, oral, BID  brimonidine, 1 drop, Right Eye, Nightly  brinzolamide, 1 drop, Both Eyes, BID  latanoprost, 1 drop, Both Eyes, Nightly  metoprolol tartrate, 100 mg, oral, BID  timolol, 1 drop, Right Eye, BID      Continuous medications     PRN medications  PRN medications: acetaminophen, oxyCODONE, oxygen     No results found for this or any previous visit (from the past 24 hour(s)).     Imaging  No results found.     Assessment/Plan   Edward Andrade is a 69 y.o. male  with PMHx significant for bilateral PE, HLD, HTN, afib RVR, IVC filter who presented to [facility] for swing admission for aggressive therapy. Patient expressed that he was preparing for a camping trip/camping when he suddenly woke up on the floor on his left side. He does not remember the fall or how long he was down really. But on waking, he called his wife and expressed that he didn't feel well. She told him to call 911. He was transported to [place] where he became hypoxic requiring intubation and subsequently required CPR and subsequently revived. He had also develped right pneumo requiring chest tube " placement. He suffered fractures of the right ribs X 3 and right clavicle from the fall. He suffered a further fracture during CPR. There is extensive bruising of the right side and an occlusive dressing right rib cage where the chest tube had been removed.       #Generalized weakness  ~s/p fall 2/2 PE resp failure~coded  ~PT/OT ordered      #PE, HLD, HTN, afib RVR, IVC filter  ~continue xarelto  ~monitor for excessive bleeding  ~continue metoprolol and amiodarone for atrial fibrillation.     Disposition: Admitted for weakness and aggressive rehab.    I spent 55 minutes in the professional and overall care of this patient.      Joan Reno, APRN-CNP  Internal Medicine

## 2024-09-21 NOTE — CARE PLAN
Over the shift, the patient did make progress toward the following goals. Pt arrived from CC to  317 for SWG program. Explain POC and PT/OT eval in the morning. Pt resting quietly throughout the night. VSS, O2 3L/NC with SpO2 greater than 92%, No sign of distress and call light within reach.

## 2024-09-21 NOTE — PROGRESS NOTES
Occupational Therapy    Evaluation    Patient Name: Edward Andrade  MRN: [id number]  Department: [facility] 3  Room: [address] [place] [address]  Today's Date: 9/21/2024  Time Calculation  Start Time: 0745  Stop Time: 0834  Time Calculation (min): 49 min    Assessment  IP OT Assessment  OT Assessment: Pt is 69 male with significant recent medical history who presents to swing rehab to return home at [facility]. Pt would benefit from OT for decreased endurance, decreased ADL's and mobility, weakness, decreased balance, and education.  Prognosis: Good  Barriers to Discharge: None  Evaluation/Treatment Tolerance: Patient tolerated treatment well  Medical Staff Made Aware: Yes  End of Session Communication: Bedside nurse  End of Session Patient Position: Up in chair, Alarm off, not on at start of session  Plan:  Treatment Interventions: ADL retraining, Functional transfer training, UE strengthening/ROM, Endurance training, Patient/family training, Neuromuscular reeducation, Compensatory technique education  OT Frequency: 5 times per week  OT Discharge Recommendations: Low intensity level of continued care  OT Recommended Transfer Status: Assist of 1  OT - OK to Discharge: Yes    Subjective   Current Problem:  No diagnosis found.  General:  General  Reason for Referral: Deconditioning following complex medical history, present for swing rehab to return home  Referred By: Elif Hooks CNP  Past Medical History Relevant to Rehab: syncopal episode and fall, saddle PE s/p tPA, R hemothorax, R clavicle fx, R 4th/5th ribs, IVC filter, cardiac arrest 9/3, hemothorax, NELSY, HTN. Glaucoma  Family/Caregiver Present: No  Prior to Session Communication: Bedside nurse  Patient Position Received: Bed, 2 rail up, Alarm off, not on at start of session  General Comment: Pt present with purewick and arabella. Significant ecchymosis R side of body. O2  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  UE Weight Bearing Status: Other (Comment) (limit  weight bearing through RUE d/t clavicle fx)  Medical Precautions: Fall precautions, Oxygen therapy device and L/min  Precautions Comment: 4/5th rib fx, clavicle fx    Vital Sign (Past 2hrs)        Date/Time Vitals Session Patient Position Pulse Resp SpO2 BP MAP (mmHg)    09/21/24 0745 Pre OT  --  83  --  98 %  --  --    09/21/24 0815 During OT  --  110  --  92 %  --  --    09/21/24 0834 Post OT  --  88  --  97 %  --  --                  Pain:  Pain Assessment  Pain Assessment: 0-10  0-10 (Numeric) Pain Score: 2  Pain Type: Acute pain  Pain Location: Rib cage  Pain Interventions: Repositioned    Objective   Cognition:  Overall Cognitive Status: Within Functional Limits  Orientation Level: Oriented X4     Home Living:  Type of Home: House  Lives With: Spouse  Home Adaptive Equipment: None  Home Layout: One level  Alternate Level Stairs-Rails: None  Alternate Level Stairs-Number of Steps: 3  Home Access: Level entry (3 steps into kitchen and access to all other living spaces)  Bathroom Shower/Tub: Tub/shower unit  Bathroom Toilet: Standard   Prior Function:  Level of [place]: Independent with ADLs and functional transfers, Independent with homemaking with ambulation  ADL Assistance: Independent  Homemaking Assistance: Independent  Ambulatory Assistance: Independent  Vocational: Retired  Hand Dominance: Right  IADL History:  Current License: Yes  Mode of Transportation: Car  ADL:  Eating Assistance: Modified independent (Device)  Grooming Assistance: Stand by  UE Dressing Assistance: Minimal  LE Dressing Assistance: Minimal  Toileting Assistance with Device: Minimal  Activity Tolerance:  Endurance: Tolerates 10 - 20 min exercise with multiple rests  Bed Mobility/Transfers: Bed Mobility  Bed Mobility: Yes  Bed Mobility 1  Bed Mobility 1: Supine to sitting  Level of Assistance 1: Minimum assistance    Transfers  Transfer: Yes  Transfer 1  Transfer From 1: Bed to  Transfer to 1: Chair with arms  Transfer Device 1:  Walker  Transfer Level of Assistance 1: Minimum assistance, Minimal verbal cues  Transfers 2  Transfer From 2: Chair with arms to  Transfer to 2: Commode-standard  Transfer Device 2: Walker  Transfer Level of Assistance 2: Minimum assistance, Moderate verbal cues  Transfers 3  Transfer From 3: Toilet to  Transfer to 3: Chair with arms  Transfer Device 3: Walker  Transfer Level of Assistance 3: Minimum assistance, Moderate verbal cues    Functional Mobility:  Functional Mobility  Functional Mobility Performed: Yes  Functional Mobility 1  Surface 1: Level tile  Device 1: Rolling walker  Assistance 1: Contact guard  Comments 1: use of walker for balance only, encouraged resting R hand on walker for steering purposes only  Sitting Balance:  Static Sitting Balance  Static Sitting-Level of Assistance: Modified Independent  Dynamic Sitting Balance  Dynamic Sitting-Level of Assistance: Modified independent  Standing Balance:  Static Standing Balance  Static Standing-Level of Assistance: Contact guard  Dynamic Standing Balance  Dynamic Standing-Level of Assistance: Minimum assistance     IADL's:   Current License: Yes  Mode of Transportation: Car  Vision: Vision - Basic Assessment  Current Vision: Other (Comment) (vision worse in R eye, fluctuates but can see TV and read paper)  Visual History: Glaucoma  Sensation:  Sensation Comment: intact     Coordination:  Movements are Fluid and Coordinated: Yes   Hand Function:  Hand Function  Gross Grasp: Functional  Coordination: Functional  Extremities: RUE   RUE : Exceptions to WFL (shoulder not formally tested d/t clavicle fx, elbow, wrist and hand WFL) and LUE   LUE: Within Functional Limits (strength not tested d/t clavicle and rib fx)    Outcome Measures: [facility] Daily Activity  Putting on and taking off regular lower body clothing: A lot  Bathing (including washing, rinsing, drying): A little  Putting on and taking off regular upper body clothing: A little  Toileting, which  includes using toilet, bedpan or urinal: A little  Taking care of personal grooming such as brushing teeth: A little  Eating Meals: None  Daily Activity - Total Score: 18    Education Documentation  Precautions, taught by Martin Christian OT at 9/21/2024  9:10 AM.  Learner: Patient  Readiness: Acceptance  Method: Explanation, Demonstration  Response: Needs Reinforcement, Demonstrated Understanding, Verbalizes Understanding  Comment: clavicle precautions, transfer training, energy conservation and breath control    ADL Training, taught by Martin Christian OT at 9/21/2024  9:10 AM.  Learner: Patient  Readiness: Acceptance  Method: Explanation, Demonstration  Response: Needs Reinforcement, Demonstrated Understanding, Verbalizes Understanding  Comment: clavicle precautions, transfer training, energy conservation and breath control    Goals:   Encounter Problems       Encounter Problems (Active)       ADLs       Patient will perform UB and LB bathing with set-up and supervision level of assistance .       Start:  09/21/24    Expected End:  10/12/24            Patient with complete upper body dressing with modified independent level of assistance donning and doffing all UE clothes with PRN adaptive equipment following precautions       Start:  09/21/24    Expected End:  10/12/24            Patient with complete lower body dressing with modified independent level of assistance donning and doffing all LE clothes  with PRN adaptive equipment       Start:  09/21/24    Expected End:  10/12/24            Patient will complete daily grooming tasks brushing teeth, shaving, and washing face/hair with modified independent level of assistance        Start:  09/21/24    Expected End:  10/12/24            Patient will complete toileting including hygiene clothing management/hygiene with modified independent level of assistance.       Start:  09/21/24    Expected End:  10/12/24               TRANSFERS       Patient will perform bed  mobility modified independent level of assistance in order to improve safety and independence with mobility       Start:  09/21/24    Expected End:  10/12/24            Patient will complete functional transfer to chair, bed, toilet, shower, car with least restrictive device with modified independent level of assistance.       Start:  09/21/24    Expected End:  10/12/24

## 2024-09-21 NOTE — PROGRESS NOTES
Physical Therapy    Physical Therapy Evaluation    Patient Name: Edward Andrade  MRN: [id number]  Department: [facility]  Room: [address] [place] [address]-  Today's Date: 9/21/2024   Time Calculation  Start Time: 0904  Stop Time: 0931  Time Calculation (min): 27 min    Assessment/Plan   PT Assessment  PT Assessment Results: Decreased range of motion, Decreased strength, Decreased endurance, Impaired balance, Pain, Orthopedic restrictions, Impaired hearing, Impaired vision  Rehab Prognosis: Good  Evaluation/Treatment Tolerance: Patient tolerated treatment well  Medical Staff Made Aware: Yes  Strengths: Housing layout, Physical health, Premorbid level of function  Barriers to Participation: Comorbidities  End of Session Communication: Bedside nurse  Assessment Comment: Pt seen for swing eval following admission for generalized weakness following complex medical history at [facility] including PE and intubation. Pt demo deficits as stated above and requires cont skilled PT intervention to prevent functional decline.  End of Session Patient Position: Up in chair, Alarm on  IP OR SWING BED PT PLAN  Inpatient or Swing Bed: Swing Bed  PT Plan  Treatment/Interventions: Bed mobility, Gait training, Transfer training, Stair training, Balance training, Neuromuscular re-education, Endurance training, Strengthening, Range of motion, Therapeutic exercise, Therapeutic activity, Home exercise program  PT Plan: Ongoing PT  PT Frequency: BID  PT Discharge Recommendations: Low intensity level of continued care  Equipment Recommended upon Discharge:  (TBD but likely cane)  PT Recommended Transfer Status: Assist x1, Assistive device  PT - OK to Discharge: Yes Based on completed evaluation and care plan recommendations, no barriers to discharge to next site of care       Subjective   General Visit Information:  General  Reason for Referral: impaired mobility and gait difficulty  Referred By: Elif Hooks CNP  Past Medical History Relevant  to Rehab: saddle PE, R hemothorax, R clavicle fx, R 4th/5th ribs, IVC filter, cardiac arrest 9/3, hemothorax, NELSY, HTN. Glaucoma  Prior to Session Communication: Bedside nurse  Patient Position Received: Up in chair, Alarm off, not on at start of session  General Comment: Pt pleasant and agreeable to participate. Eager to improve and return home. Left w call bell in reach and needs addressed  Home Living:  Home Living  Type of Home: House  Lives With: Spouse  Home Adaptive Equipment: None  Home Layout: One level  Alternate Level Stairs-Rails: None  Alternate Level Stairs-Number of Steps: 3  Home Access: Level entry (0 JUAN house but 3 steps into kitchen and access to all other living spaces)  Bathroom Shower/Tub: Tub/shower unit  Bathroom Toilet: Standard  Home Living Comments: sleeps in reg bed, denies any falls in last year  Prior Level of Function:  Prior Function Per Pt/Caregiver Report  Level of [place]: Independent with ADLs and functional transfers, Independent with homemaking with ambulation  Prior Function Comments: indep w all ADLs, performing walking program  Precautions:  Precautions  Hearing/Visual Limitations: glaucoma, worse in R eye  Medical Precautions: Fall precautions, Oxygen therapy device and L/min  Precautions Comment: purewick, 2L O2, R 4th and 5th rib fx, R clavicle fx, RUE NWB at this time though no sling, low gait belt, wound on R upper back    Vital Signs (Past 2hrs)        Date/Time Vitals Session Patient Position Pulse Resp SpO2 BP MAP (mmHg)    09/21/24 0815 During OT  --  110  --  92 %  --  --    09/21/24 0834 Post OT  --  88  --  97 %  --  --    09/21/24 0904 Pre PT  --  97  --  94 %  112/73  --    09/21/24 0941 --  --  --  --  97 %  --  --                        Objective   Pain:  Pain Assessment  Pain Assessment: 0-10  0-10 (Numeric) Pain Score: 2  Pain Type: Acute pain  Pain Location:  (R proximal upper body)  Pain Interventions: Repositioned (pillow under  arm)  Cognition:  Cognition  Orientation Level: Oriented X4    General Assessments:   Activity Tolerance  Endurance: Tolerates 10 - 20 min exercise with multiple rests    Sensation  Sensation Comment: no apparant deficits    Postural Control  Postural Control: Within Functional Limits    Static Sitting Balance  Static Sitting-Balance Support: Feet supported, No upper extremity supported  Static Sitting-Level of Assistance: Independent  Static Sitting-Comment/Number of Minutes: mult min seated in chair    Static Standing Balance  Static Standing-Balance Support: No upper extremity supported  Static Standing-Level of Assistance: Contact guard  Static Standing-Comment/Number of Minutes: Pt able to hold NBOS for 30 sec no sway. Able to hold romberg stance approx 20 sec ea side, increased sway and unsteadiness noted.  Dynamic Standing Balance  Dynamic Standing-Comments: minor LOB upon taking small step backwards towards chair, able to correct w min A from PT.  Functional Assessments:  Transfer 1  Transfer From 1: Chair with arms to  Transfer to 1: Stand, Sit  Technique 1: Stand to sit, Sit to stand  Transfer Device 1: Walker, Cane  Transfer Level of Assistance 1: Minimum assistance, Minimal verbal cues  Trials/Comments 1: mult trials using both FWW and LBQC. initial vcs for L hand placement but after initial cueing no more vcs required. needing to scoot fwd in chair to perform, min A to initiate rise.    Ambulation/Gait Training  Ambulation/Gait Training Performed: Yes  Ambulation/Gait Training 1  Surface 1: Level tile  Device 1: Rolling walker  Gait Support Devices: Gait belt  Assistance 1: Contact guard  Quality of Gait 1: Decreased step length (decreased prosper foot clearance and slow stephanie)  Comments/Distance (ft) 1: 1x10 ft in room, pt demo minor SOB following and requesting a moment to sit. Overall fair mechanics, but pt was placing fair amount of weight through RUE.  Ambulation/Gait Training 2  Surface 2: Level  tile  Device 2: Large base quad cane  Gait Support Devices: Gait belt  Assistance 2: Contact guard  Quality of Gait 2: Decreased step length (slow stephanie and decreased prosper foot clearance)  Comments/Distance (ft) 2: 1x20 ft. Good sequencing w AD. no LOB, steadiness improving w cont practice. Overall more functional AD for this patient. cont SOB noted w increased rest break needed after trial/  Extremity/Trunk Assessments:  RUE   RUE : Exceptions to WFL (shoulder not formally tested d/t clavicle fx, elbow, wrist and hand WFL)  LUE   LUE: Within Functional Limits  RLE   RLE : Within Functional Limits  LLE   LLE : Within Functional Limits  Outcome Measures:  [facility] Basic Mobility  Turning from your back to your side while in a flat bed without using bedrails: None  Moving from lying on your back to sitting on the side of a flat bed without using bedrails: A little  Moving to and from bed to chair (including a wheelchair): A little  Standing up from a chair using your arms (e.g. wheelchair or bedside chair): A little  To walk in hospital room: A little  Climbing 3-5 steps with railing: A lot  Basic Mobility - Total Score: 18    Encounter Problems       Encounter Problems (Active)       PT Problem       pt will be able to ambulate >100 ft with FWW vs cane and modified independent        Start:  09/21/24    Expected End:  10/05/24            Pt will be able to perform STS transfer with modified independent        Start:  09/21/24    Expected End:  10/05/24            Pt will be able to perform all aspects of bed mobility with modified independent        Start:  09/21/24    Expected End:  10/05/24            Pt will be able to navigate 3 steps with no hand rail and modified independent to allow for safe DC.         Start:  09/21/24    Expected End:  10/05/24            Pt will demonstrate ability to  object from ground from standing position, with LRAD and supervision       Start:  09/21/24    Expected End:  10/05/24                Pain - Adult              Education Documentation  Precautions, taught by Deepika Donato PT at 9/21/2024  9:54 AM.  Learner: Patient  Readiness: Acceptance  Method: Explanation  Response: Verbalizes Understanding, Demonstrated Understanding  Comment: Pt edu on role of PT and POC, as well as importance of mobility and use of AD. Pt also edu on px regarding fx.    Mobility Training, taught by Deepika Donato PT at 9/21/2024  9:54 AM.  Learner: Patient  Readiness: Acceptance  Method: Explanation  Response: Verbalizes Understanding, Demonstrated Understanding  Comment: Pt edu on role of PT and POC, as well as importance of mobility and use of AD. Pt also edu on px regarding fx.    Education Comments  No comments found.

## 2024-09-22 VITALS
OXYGEN SATURATION: 97 % | HEIGHT: 72 IN | HEART RATE: 91 BPM | DIASTOLIC BLOOD PRESSURE: 81 MMHG | RESPIRATION RATE: 18 BRPM | BODY MASS INDEX: 26.78 KG/M2 | TEMPERATURE: 97 F | SYSTOLIC BLOOD PRESSURE: 146 MMHG | WEIGHT: 197.75 LBS

## 2024-09-22 PROCEDURE — 1900000001 HC SKILLED SWING ROOM

## 2024-09-22 PROCEDURE — 2500000002 HC RX 250 W HCPCS SELF ADMINISTERED DRUGS (ALT 637 FOR MEDICARE OP, ALT 636 FOR OP/ED): Performed by: NURSE PRACTITIONER

## 2024-09-22 PROCEDURE — 2500000005 HC RX 250 GENERAL PHARMACY W/O HCPCS: Performed by: INTERNAL MEDICINE

## 2024-09-22 PROCEDURE — 2500000001 HC RX 250 WO HCPCS SELF ADMINISTERED DRUGS (ALT 637 FOR MEDICARE OP): Performed by: NURSE PRACTITIONER

## 2024-09-22 PROCEDURE — 94760 N-INVAS EAR/PLS OXIMETRY 1: CPT

## 2024-09-22 PROCEDURE — 2500000005 HC RX 250 GENERAL PHARMACY W/O HCPCS: Performed by: NURSE PRACTITIONER

## 2024-09-22 RX ORDER — LIDOCAINE 560 MG/1
1 PATCH PERCUTANEOUS; TOPICAL; TRANSDERMAL DAILY
Status: DISCONTINUED | OUTPATIENT
Start: 2024-09-22 | End: 2024-09-22

## 2024-09-22 RX ORDER — BRIMONIDINE TARTRATE 2 MG/ML
1 SOLUTION/ DROPS OPHTHALMIC 2 TIMES DAILY
Status: DISCONTINUED | OUTPATIENT
Start: 2024-09-22 | End: 2024-09-27 | Stop reason: HOSPADM

## 2024-09-22 RX ORDER — LATANOPROSTENE BUNOD 0.24 MG/ML
1 SOLUTION/ DROPS OPHTHALMIC NIGHTLY
Status: ON HOLD | COMMUNITY
End: 2024-09-27

## 2024-09-22 RX ORDER — DORZOLAMIDE HCL 20 MG/ML
1 SOLUTION/ DROPS OPHTHALMIC 2 TIMES DAILY
Status: ON HOLD | COMMUNITY
End: 2024-09-27

## 2024-09-22 RX ORDER — DORZOLAMIDE HCL 20 MG/ML
1 SOLUTION/ DROPS OPHTHALMIC 2 TIMES DAILY
Status: DISCONTINUED | OUTPATIENT
Start: 2024-09-22 | End: 2024-09-27 | Stop reason: HOSPADM

## 2024-09-22 RX ORDER — LIDOCAINE 560 MG/1
1 PATCH PERCUTANEOUS; TOPICAL; TRANSDERMAL NIGHTLY
Status: DISCONTINUED | OUTPATIENT
Start: 2024-09-22 | End: 2024-09-27 | Stop reason: HOSPADM

## 2024-09-22 ASSESSMENT — COGNITIVE AND FUNCTIONAL STATUS - GENERAL
TOILETING: A LITTLE
MOVING TO AND FROM BED TO CHAIR: A LITTLE
PERSONAL GROOMING: A LITTLE
CLIMB 3 TO 5 STEPS WITH RAILING: A LOT
MOVING FROM LYING ON BACK TO SITTING ON SIDE OF FLAT BED WITH BEDRAILS: A LITTLE
DRESSING REGULAR LOWER BODY CLOTHING: A LITTLE
TURNING FROM BACK TO SIDE WHILE IN FLAT BAD: A LITTLE
MOVING TO AND FROM BED TO CHAIR: A LITTLE
DAILY ACTIVITIY SCORE: 19
HELP NEEDED FOR BATHING: A LITTLE
MOBILITY SCORE: 16
TURNING FROM BACK TO SIDE WHILE IN FLAT BAD: A LITTLE
MOVING FROM LYING ON BACK TO SITTING ON SIDE OF FLAT BED WITH BEDRAILS: A LITTLE
PERSONAL GROOMING: A LITTLE
HELP NEEDED FOR BATHING: A LITTLE
CLIMB 3 TO 5 STEPS WITH RAILING: A LOT
DRESSING REGULAR UPPER BODY CLOTHING: A LITTLE
STANDING UP FROM CHAIR USING ARMS: A LOT
STANDING UP FROM CHAIR USING ARMS: A LOT
DAILY ACTIVITIY SCORE: 19
DRESSING REGULAR UPPER BODY CLOTHING: A LITTLE
MOBILITY SCORE: 17
TOILETING: A LITTLE
WALKING IN HOSPITAL ROOM: A LITTLE
DRESSING REGULAR LOWER BODY CLOTHING: A LITTLE

## 2024-09-22 ASSESSMENT — PAIN SCALES - GENERAL
PAINLEVEL_OUTOF10: 2
PAINLEVEL_OUTOF10: 3

## 2024-09-22 ASSESSMENT — PAIN - FUNCTIONAL ASSESSMENT: PAIN_FUNCTIONAL_ASSESSMENT: 0-10

## 2024-09-22 NOTE — CARE PLAN
The patient's goals for the shift include      The clinical goals for the shift include patient will get a restful nights sleep    Over the shift, the patient did make progress toward the following goals.

## 2024-09-23 PROCEDURE — 2500000002 HC RX 250 W HCPCS SELF ADMINISTERED DRUGS (ALT 637 FOR MEDICARE OP, ALT 636 FOR OP/ED): Performed by: NURSE PRACTITIONER

## 2024-09-23 PROCEDURE — 97110 THERAPEUTIC EXERCISES: CPT | Mod: GP

## 2024-09-23 PROCEDURE — 97112 NEUROMUSCULAR REEDUCATION: CPT | Mod: GP

## 2024-09-23 PROCEDURE — 2500000001 HC RX 250 WO HCPCS SELF ADMINISTERED DRUGS (ALT 637 FOR MEDICARE OP): Performed by: NURSE PRACTITIONER

## 2024-09-23 PROCEDURE — 97116 GAIT TRAINING THERAPY: CPT | Mod: GP

## 2024-09-23 PROCEDURE — 97530 THERAPEUTIC ACTIVITIES: CPT | Mod: GP

## 2024-09-23 PROCEDURE — 2500000005 HC RX 250 GENERAL PHARMACY W/O HCPCS: Performed by: INTERNAL MEDICINE

## 2024-09-23 PROCEDURE — 1900000001 HC SKILLED SWING ROOM

## 2024-09-23 PROCEDURE — 94760 N-INVAS EAR/PLS OXIMETRY 1: CPT

## 2024-09-23 PROCEDURE — 97535 SELF CARE MNGMENT TRAINING: CPT | Mod: GO

## 2024-09-23 ASSESSMENT — COGNITIVE AND FUNCTIONAL STATUS - GENERAL
CLIMB 3 TO 5 STEPS WITH RAILING: A LOT
TURNING FROM BACK TO SIDE WHILE IN FLAT BAD: A LITTLE
STANDING UP FROM CHAIR USING ARMS: A LITTLE
MOBILITY SCORE: 19
PERSONAL GROOMING: A LITTLE
TOILETING: A LITTLE
MOVING TO AND FROM BED TO CHAIR: A LITTLE
MOVING TO AND FROM BED TO CHAIR: A LITTLE
WALKING IN HOSPITAL ROOM: A LITTLE
DRESSING REGULAR UPPER BODY CLOTHING: A LITTLE
TOILETING: A LITTLE
DRESSING REGULAR UPPER BODY CLOTHING: A LITTLE
WALKING IN HOSPITAL ROOM: A LITTLE
MOBILITY SCORE: 20
WALKING IN HOSPITAL ROOM: A LITTLE
HELP NEEDED FOR BATHING: A LITTLE
DAILY ACTIVITIY SCORE: 19
PERSONAL GROOMING: A LITTLE
MOBILITY SCORE: 16
DRESSING REGULAR LOWER BODY CLOTHING: A LITTLE
HELP NEEDED FOR BATHING: A LITTLE
DRESSING REGULAR LOWER BODY CLOTHING: A LITTLE
TURNING FROM BACK TO SIDE WHILE IN FLAT BAD: A LITTLE
MOVING TO AND FROM BED TO CHAIR: A LITTLE
CLIMB 3 TO 5 STEPS WITH RAILING: A LITTLE
STANDING UP FROM CHAIR USING ARMS: A LOT
DAILY ACTIVITIY SCORE: 19
STANDING UP FROM CHAIR USING ARMS: A LITTLE
CLIMB 3 TO 5 STEPS WITH RAILING: A LITTLE
MOVING FROM LYING ON BACK TO SITTING ON SIDE OF FLAT BED WITH BEDRAILS: A LITTLE

## 2024-09-23 ASSESSMENT — PAIN - FUNCTIONAL ASSESSMENT
PAIN_FUNCTIONAL_ASSESSMENT: 0-10

## 2024-09-23 ASSESSMENT — PAIN SCALES - GENERAL
PAINLEVEL_OUTOF10: 3
PAINLEVEL_OUTOF10: 2
PAINLEVEL_OUTOF10: 0 - NO PAIN
PAINLEVEL_OUTOF10: 4
PAINLEVEL_OUTOF10: 3
PAINLEVEL_OUTOF10: 0 - NO PAIN

## 2024-09-23 ASSESSMENT — ACTIVITIES OF DAILY LIVING (ADL)
HOME_MANAGEMENT_TIME_ENTRY: 38
BATHING_LEVEL_OF_ASSISTANCE: SETUP;CLOSE SUPERVISION;MINIMAL VERBAL CUES
BATHING_WHERE_ASSESSED: SHOWER

## 2024-09-23 NOTE — CONSULTS
"Nutrition Initial Assessment:   Nutrition Assessment    Reason for Assessment: Dietitian discretion (G admission)    Patient is a 69 y.o. male presenting to [facility] for [facility] rehab. Pt had an acute admission after a fall, called 911, transported to [facility] where he became hypoxic required CPR and intubation. He had develped right pneumo requiring chest tube placement. He suffered fractures of the right ribs X 3 and right clavicle from the fall. He suffered a further fracture during CPR. There is extensive bruising of the right side and an occlusive dressing right rib cage where the chest tube had been removed.     PMHx significant for bilateral PE, HLD, HTN, afib RVR     Nutrition History:  Energy Intake: Fair 50-75 %  Food and Nutrient History: Pt states prior to acute care admission, he had good intake and appetite. After intubation at [facility], he had difficulty swallowing and poor appetite. Pt states he no longer had difficulty swallowing. Also states his appetite is slowly improving. Pt concerned with wt loss and requesting Vanilla Ensure with every meal until appetite fully returns. Meals today: Breakfast - omelet and OJ; Lunch - turkey, potato, vanilla ice cream. Pt reports a UBW of 205 lbs. Has no other nutrition concerns at this time.  Food Allergies/Intolerances:  None  GI Symptoms: None  Oral Problems: None     Skilled Unit Only  Appetite/Fluid intake:  good (%) -to- fair (50-74%)   Feeding Ability:  independent   Dentition:  own/edentulous   Oral Function:  no problem present      Anthropometrics:  Height: 182 cm (5' 11.65\")   Weight: 89.7 kg (197 lb 12 oz)   BMI (Calculated): 27.08  IBW/kg (Dietitian Calculated): 77.3 kg  Percent of IBW: 116 %     Weight History:   Wt Readings from Last 10 Encounters:   09/20/24 89.7 kg (197 lb 12 oz)   08/16/24 94.8 kg (209 lb)   09/12/23 93.9 kg (207 lb)      Weight Change %:  Weight History / % Weight Change: 09/20/24- 89.7 kg;  lbs / 93 kg (3.3 kg loss / " 3.5% loss x1 month)  Significant Weight Loss: No    Nutrition Focused Physical Exam Findings:  defer: improving appetite, no significant wt loss  Edema:  Edema: +1 trace  Edema Location: BLE  Physical Findings:  Skin: Positive (back wound per flowsheet)    Nutrition Significant Labs:  none    Nutrition Specific Medications:  amiodarone, 200 mg, oral, Daily  apixaban, 5 mg, oral, BID  brimonidine, 1 drop, Both Eyes, BID  dorzolamide, 1 drop, Both Eyes, BID  latanoprost, 1 drop, Both Eyes, Nightly  lidocaine, 1 patch, transdermal, Nightly  metoprolol tartrate, 100 mg, oral, BID    I/O:   Last BM Date: 09/20/24;      Dietary Orders (From admission, onward)       Start     Ordered    09/21/24 0732  Adult diet Regular  Diet effective now        Question:  Diet type  Answer:  Regular    09/21/24 0731                     Estimated Needs:   Total Energy Estimated Needs (kCal): 1935 kCal  Method for Estimating Needs: 25 Kcal/kg IBW  Total Protein Estimated Needs (g): 75 g  Method for Estimating Needs: 1.0 gm/kg IBW  Total Fluid Estimated Needs (mL): 1935 mL  Method for Estimating Needs: 1 mL/Kcal        Nutrition Diagnosis   Nutrition Diagnosis  Patient has Nutrition Diagnosis: Yes  Diagnosis Status (1): New  Nutrition Diagnosis 1: Inadequate oral intake  Related to (1): Decreased appetite s/p intubation  As Evidenced by (1): Pt complaining of decreased appetite; decreased appetite; unintentional wt loss       Nutrition Interventions/Recommendations         Nutrition Prescription:  Individualized Nutrition Prescription Provided for : diet, fluids, ONS        Nutrition Interventions:   Interventions: Meals and snacks, Medical food supplement  Meals and Snacks: General healthful diet  Goal: Continue regular diet as ordered  Medical Food Supplement: Commercial beverage  Goal: Ensure plus high protein TID (350 Kcal and 20 gm protein per 8 fl oz) - per pt request  Additional Interventions: monitor weekly weight    Collaboration  and Referral of Nutrition Care: Collaboration by nutrition professional with other providers  Goal: IDT meeting    Nutrition Education:   Current weight, amount of weight lost      Nutrition Monitoring and Evaluation   Food/Nutrient Related History Monitoring  Monitoring and Evaluation Plan: Energy intake, Amount of food  Energy Intake: Estimated energy intake  Criteria: Pt will consume >75% estimated needs  Amount of Food: Medical food intake  Criteria: Pt will consume >75% ONS     Nutrition Focused Physical Findings  Other: Evaluate nutrition intervention as compared to nutrition goal(s) and estimated nutrient need criteria.       Follow Up  Time Spent (min): 60 minutes  Last Date of Nutrition Visit: 09/23/24  Nutrition Follow-Up Needed?: Dietitian to reassess per policy  Follow up Comment: Ensure TID per request

## 2024-09-23 NOTE — CARE PLAN
The patient's goals for the shift include      The clinical goals for the shift include Pt will participate in therapies as ordered while in swing bed program    Over the shift, the patient had an uneventful night  VSS  Denies need for pain medication  Educated patient on when to take pain meds prior to getting out of bed and working with therapies  Verbalizes understanding  Slept well   Ambulates to bathroom with walker and stand by assist  Tolerates activity well  Call light in reach  Safety measures maintained

## 2024-09-23 NOTE — PROGRESS NOTES
Physical Therapy    Physical Therapy Treatment    Patient Name: Edward Andrade  MRN: [id number]  Department: [facility]  Room: [address] [place] [address]  Today's Date: 9/23/2024  Time Calculation  Start Time: 1245  Stop Time: 1319  Time Calculation (min): 34 min    Assessment/Plan   PT Assessment  End of Session Communication: Bedside nurse  Assessment Comment: Progressing well  End of Session Patient Position: Up in chair, Alarm on     PT Plan  Treatment/Interventions: Bed mobility, Gait training, Transfer training, Stair training, Balance training, Neuromuscular re-education, Endurance training, Strengthening, Range of motion, Therapeutic exercise, Therapeutic activity, Home exercise program  PT Plan: Ongoing PT  PT Frequency: BID  PT Discharge Recommendations: Low intensity level of continued care  Equipment Recommended upon Discharge:  (TBD but likely cane)  PT Recommended Transfer Status: Assist x1, Assistive device  PT - OK to Discharge: Yes      General Visit Information:   PT  Visit  PT Received On: 09/23/24  General  Prior to Session Communication: Bedside nurse  Patient Position Received: Up in chair, Alarm on  General Comment: Patient doing well on first full swing day. Receptive to PT.    Subjective   Precautions:  Falls     Vital Signs (Past 2hrs)           Vital Signs Comment: Monitoring O2 sats which did not drop below 91 with activity and generally remained 96 and up during session.     Objective   Pain:  Pain Assessment  Pain Assessment: 0-10  0-10 (Numeric) Pain Score: 0 - No pain    Cognition:  Cognition  Overall Cognitive Status: Within Functional Limits     Postural Control:  Static Sitting Balance  Static Sitting-Level of Assistance: Close supervision  Dynamic Standing Balance  Dynamic Standing-Level of Assistance: Close supervision, Contact guard    Activity Tolerance:  Activity Tolerance  Activity Tolerance Comments: Performing activities with good tolerance focusing on maintaining O2 sats >92 on  2L    Treatments:  Therapeutic Exercise  Therapeutic Exercise Performed: Yes  Therapeutic Exercise Activity 1: Alternate marching 2x10  Therapeutic Exercise Activity 2: LAQ 2x10  Therapeutic Exercise Activity 3: calf raise 2x10  Therapeutic Exercise Activity 4: sit to stand with LUE only 2x5    Ambulation/Gait Training  Ambulation/Gait Training Performed: Yes  Ambulation/Gait Training 1  Device 1: Large base quad cane  Gait Support Devices: Gait belt  Assistance 1: Close supervision, Contact guard  Quality of Gait 1: Inconsistent stride length  Comments/Distance (ft) 1: 1x80  Ambulation/Gait Training 2  Device 2: Dolan rail  Gait Support Devices: Gait belt  Assistance 2: Close supervision  Quality of Gait 2: Inconsistent stride length  Comments/Distance (ft) 2: 2x80 ft  Transfer 1  Technique 1: Sit to stand, Stand to sit  Transfer Level of Assistance 1: Close supervision    Outcome Measures:  [facility] Basic Mobility  Turning from your back to your side while in a flat bed without using bedrails: None  Moving from lying on your back to sitting on the side of a flat bed without using bedrails: None  Moving to and from bed to chair (including a wheelchair): A little  Standing up from a chair using your arms (e.g. wheelchair or bedside chair): A little  To walk in hospital room: A little  Climbing 3-5 steps with railing: A little  Basic Mobility - Total Score: 20    Education Documentation  No documentation found.  Education Comments  No comments found.        OP EDUCATION:       Encounter Problems       Encounter Problems (Active)       PT Problem       pt will be able to ambulate >100 ft with FWW vs cane and modified independent  (Progressing)       Start:  09/21/24    Expected End:  10/05/24            Pt will be able to perform STS transfer with modified independent  (Progressing)       Start:  09/21/24    Expected End:  10/05/24            Pt will be able to perform all aspects of bed mobility with modified independent   (Progressing)       Start:  09/21/24    Expected End:  10/05/24            Pt will be able to navigate 3 steps with no hand rail and modified independent to allow for safe DC.   (Progressing)       Start:  09/21/24    Expected End:  10/05/24            Pt will demonstrate ability to  object from ground from standing position, with LRAD and supervision (Progressing)       Start:  09/21/24    Expected End:  10/05/24               Pain - Adult

## 2024-09-23 NOTE — PROGRESS NOTES
Physical Therapy    Physical Therapy Treatment    Patient Name: Edward Andrade  MRN: [id number]  Department: [facility]  Room: [address] [place] [address]  Today's Date: 9/23/2024  Time Calculation  Start Time: 0853  Stop Time: 0927  Time Calculation (min): 34 min         Assessment/Plan   PT Assessment  End of Session Communication: Bedside nurse, PCT/NA/CTA  Assessment Comment: Pt tolerated session well. Pt challenged with balance activities. Pt needing mult rest breaks throughout session to recover from SOB. Pt remains motivated to return home and participate in rehab.  End of Session Patient Position: Up in chair, Alarm on     PT Plan  Treatment/Interventions: Bed mobility, Gait training, Transfer training, Stair training, Balance training, Neuromuscular re-education, Endurance training, Strengthening, Range of motion, Therapeutic exercise, Therapeutic activity, Home exercise program  PT Plan: Ongoing PT  PT Frequency: BID  PT Discharge Recommendations: Low intensity level of continued care  Equipment Recommended upon Discharge:  (TBD but likely cane)  PT Recommended Transfer Status: Assist x1, Assistive device  PT - OK to Discharge: Yes      General Visit Information:   PT  Visit  PT Received On: 09/23/24  Response to Previous Treatment: Patient with no complaints from previous session.  General  Prior to Session Communication: Bedside nurse  Patient Position Received: Up in chair, Alarm on  General Comment: Pt pleasant and agreeable to participate. Pt cleared by nursing staff. Left with call bell within reach and needs addressed.    Subjective   Precautions:  Precautions  Medical Precautions: Fall precautions  Precautions Comment: purewick, 2L O2, R 4th and 5th rib fx, R clavicle fx, RUE NWB at this time though no sling, low gait belt, wound on R upper back      Vital Signs Comment: O2 monitored throughout session, not dropping below 96% w activity.     Objective   Pain:  Pain Assessment  Pain Assessment: 0-10  0-10 (Numeric)  Pain Score: 0 - No pain    Treatments:  Therapeutic Exercise  Therapeutic Exercise Performed: Yes  Therapeutic Exercise Activity 1: 2x10 heel raises  Therapeutic Exercise Activity 2: 2x20 standing alt marches  Therapeutic Exercise Activity 3: Attempted standing hip abd but unable to perform d/t inability to hold unilateral stance even w LUE support    Balance/Neuromuscular Re-Education  Balance/Neuromuscular Re-Education Activity Performed: Yes  Balance/Neuromuscular Re-Education Activity 1: 1x10 lateral stepping w LUE holding neuro pole. increased difficulty on R compared to L. minor unsteadiness noted throughout.  Balance/Neuromuscular Re-Education Activity 2: 1x10 fwd stepping w LUE holding neuro pole. increased difficulty on R compared to L. general unsteadiness and taking increased time to prepare for movement on R.  Balance/Neuromuscular Re-Education Activity 3: 1x10 step taps to target. cont use of neuropole in LUE and cont increase in difficulty on R. needing mult attempts on R side but improving w cont practice.    Ambulation/Gait Training 1  Surface 1: Level tile  Device 1: Rolling walker  Gait Support Devices: Gait belt  Assistance 1: Contact guard  Quality of Gait 1: Decreased step length  Comments/Distance (ft) 1: 2x30 ft. Good sequencing w cane. on 2nd trial pt lifting AD to trial use w/o AD. no LOB noted but there were 2 instances of minor LOB when returning to chair.  Transfer 1  Transfer From 1: Chair with arms to  Transfer to 1: Stand, Sit  Technique 1: Stand to sit, Sit to stand  Transfer Device 1: Walker, Cane  Transfer Level of Assistance 1: Contact guard, Close supervision  Trials/Comments 1: mult trials during session. beginning w CGA and progressing to close supervision w cont practice. needing to scoot towards edge of chair to perform.  Transfers 2  Transfer From 2: Chair without arms to  Transfer to 2: Stand, Sit  Technique 2: Sit to stand, Stand to sit  Transfer Device 2: Gait belt,  Cane  Transfer Level of Assistance 2: Contact guard  Trials/Comments 2: 1 trial. similar to above transfers    Stairs  Stairs: Yes  Stairs  Rails 1: Left, Right  Device 1: Railing  Support Devices 1: Gait belt  Assistance 1: Contact guard  Comment/Number of Steps 1: 1x2 6'' steps w use of rail using LUE. Pt self selected reciprocal pattern. no LOB but needing standing rest break following trial.  Stairs 2  Rails 2: None (Comment)  Device 2: Large base quad cane  Support Devices 2: Gait belt  Assistance 2: Contact guard  Comment/Number of Steps 2: 1x2 6'' steps. Pt needing vcs for sequencing but otherwise able to perform w/o LOB.    Outcome Measures:  [facility] Basic Mobility  Turning from your back to your side while in a flat bed without using bedrails: None  Moving from lying on your back to sitting on the side of a flat bed without using bedrails: A little  Moving to and from bed to chair (including a wheelchair): A little  Standing up from a chair using your arms (e.g. wheelchair or bedside chair): A little  To walk in hospital room: A little  Climbing 3-5 steps with railing: A little  Basic Mobility - Total Score: 19    Education Documentation  No documentation found.  Education Comments  No comments found.      Encounter Problems       Encounter Problems (Active)       PT Problem       pt will be able to ambulate >100 ft with FWW vs cane and modified independent  (Progressing)       Start:  09/21/24    Expected End:  10/05/24            Pt will be able to perform STS transfer with modified independent  (Progressing)       Start:  09/21/24    Expected End:  10/05/24            Pt will be able to perform all aspects of bed mobility with modified independent  (Progressing)       Start:  09/21/24    Expected End:  10/05/24            Pt will be able to navigate 3 steps with no hand rail and modified independent to allow for safe DC.   (Progressing)       Start:  09/21/24    Expected End:  10/05/24            Pt will  demonstrate ability to  object from ground from standing position, with LRAD and supervision (Progressing)       Start:  09/21/24    Expected End:  10/05/24               Pain - Adult

## 2024-09-23 NOTE — PROGRESS NOTES
09/23/24 1255   Discharge Planning   Living Arrangements Spouse/significant other   Support Systems Spouse/significant other   Assistance Needed has a wheeled walker at home, usually indep with ADLS   Type of Residence Private residence   Expected Discharge Disposition Home     Swing Bed Social Assessment:    Pt arrived from: [facility]  Inpatient [id number] dates:  9/3-9/20/24  Diagnosis:  resp failure and fall at home  Skilled servcies needed: PT/OT  Follow-up appointments needed:    Patient lives with: spouse  Prior level of function:  Independent,   DME available: walker  Support people/agencies: spouse  Weekly Swing Bed Meeing will be on:    TUES   at 11:30  Patient requests that following person/people to be given updates and/or attend meeting:  sp  Patient is expected to reach home safety goals by:  10/5  Expected needs at discharge: possible  services for further PT    SIGRID Betancourt

## 2024-09-23 NOTE — PROGRESS NOTES
Occupational Therapy    Occupational Therapy Treatment    Name: Edward Andrade  MRN: [id number]  Department: [facility]  Room: [facility]317-A  Date: 09/23/24  Time Calculation  Start Time: 0941  Stop Time: 1019  Time Calculation (min): 38 min    Assessment:  Evaluation/Treatment Tolerance: Patient tolerated treatment well (slightly fatigued from shower)  End of Session Communication: Bedside nurse, PCT/NA/KALYANI  End of Session Patient Position: Up in chair, Alarm on  Plan:  Treatment Interventions: ADL retraining, Functional transfer training, UE strengthening/ROM, Endurance training, Patient/family training, Neuromuscular reeducation, Compensatory technique education  OT Frequency: 5 times per week  OT Discharge Recommendations: Low intensity level of continued care  OT Recommended Transfer Status: Assist of 1  OT - OK to Discharge: Yes    Subjective   Previous Visit Info:  OT Received On: 09/23/24  General:  General  Prior to Session Communication: Bedside nurse, PCT/CLIF/KALYANI  Patient Position Received: Up in chair, Alarm on  General Comment: Pt pleseant and cooperative throughout session.left with all needs in reach following.  Precautions:  UE Weight Bearing Status: Other (Comment) (limit WB through R arm d/t clavicle fx)  Medical Precautions: Fall precautions  Precautions Comment: 2L O2, R 4th and 5th rib fx, R clavicle fx, low gait belt, wound on R upper back    Vital Signs Comment: O2 satting between 96-99 throughout session.    Pain Assessment:  Pain Assessment  Pain Assessment: 0-10  0-10 (Numeric) Pain Score: 2  Pain Type: Acute pain  Pain Location: Rib cage  Pain Orientation: Right  Pain Interventions: Repositioned (warm shower)     Objective   Cognition:  Overall Cognitive Status: Within Functional Limits  Activities of Daily Living:   UE Bathing  UE Bathing Level of Assistance: Setup, Close supervision, Moderate verbal cues (cues to problem solve limited use of R arm)  UE Bathing Where Assessed: Shower  (seated)    LE Bathing  LE Bathing Level of Assistance: Setup, Close supervision, Minimal verbal cues (cues to problem solve limited use of R arm)  LE Bathing Where Assessed: Shower (seated)    UE Dressing  UE Dressing Level of Assistance: Setup, Minimum assistance, Moderate verbal cues  UE Dressing Where Assessed: Recliner  UE Dressing Comments: pt educated on adaptive technique to perform UB dressing with less assistance. Able to follow cues well and complete for a second time without assistance.    LE Dressing  LE Dressing: Yes  Pants Level of Assistance: Close supervision, Setup (educated on figure four technique and able to complete without assistance)  Sock Level of Assistance:  (assisting with removal of wet socks and donning dry socks in shower d/t lack of space and to increased patient saety with ambulation back to the chair.)  LE Dressing Where Assessed: Recliner     Bed Mobility/Transfers:   Transfer 1  Transfer From 1: Chair with arms to  Transfer to 1: Stand, Sit  Technique 1: Stand to sit, Sit to stand  Transfer Device 1: Walker  Transfer Level of Assistance 1: Close supervision  Trials/Comments 1: completed multiple times throughout session. Good following of cues for limited weightbaring on R side throughout.    Shower Transfers  Shower Transfer From: Walker  Shower Transfer Type: To and from  Shower Transfer to: Shower seat with back  Shower Transfer Technique: Ambulating  Shower Transfers: Contact guard, Verbal cues  Shower Transfers Comments: good safety and cue following throughout    Functional Mobility:  Functional Mobility  Functional Mobility Performed: Yes  Functional Mobility 1  Surface 1: Level tile  Device 1: Rolling walker  Assistance 1: Contact guard    Outcome Measures:  [facility] Daily Activity  Putting on and taking off regular lower body clothing: A little  Bathing (including washing, rinsing, drying): A little  Putting on and taking off regular upper body clothing: A little  Toileting,  which includes using toilet, bedpan or urinal: A little  Taking care of personal grooming such as brushing teeth: A little  Eating Meals: None  Daily Activity - Total Score: 19    Goals:  Encounter Problems       Encounter Problems (Active)       ADLs       Patient will perform UB and LB bathing with set-up and supervision level of assistance . (Progressing)       Start:  09/21/24    Expected End:  10/12/24            Patient with complete upper body dressing with modified independent level of assistance donning and doffing all UE clothes with PRN adaptive equipment following precautions (Progressing)       Start:  09/21/24    Expected End:  10/12/24            Patient with complete lower body dressing with modified independent level of assistance donning and doffing all LE clothes  with PRN adaptive equipment (Progressing)       Start:  09/21/24    Expected End:  10/12/24            Patient will complete daily grooming tasks brushing teeth, shaving, and washing face/hair with modified independent level of assistance        Start:  09/21/24    Expected End:  10/12/24            Patient will complete toileting including hygiene clothing management/hygiene with modified independent level of assistance.       Start:  09/21/24    Expected End:  10/12/24               TRANSFERS       Patient will perform bed mobility modified independent level of assistance in order to improve safety and independence with mobility       Start:  09/21/24    Expected End:  10/12/24            Patient will complete functional transfer to chair, bed, toilet, shower, car with least restrictive device with modified independent level of assistance. (Progressing)       Start:  09/21/24    Expected End:  10/12/24

## 2024-09-24 PROCEDURE — 2500000005 HC RX 250 GENERAL PHARMACY W/O HCPCS: Performed by: NURSE PRACTITIONER

## 2024-09-24 PROCEDURE — 97110 THERAPEUTIC EXERCISES: CPT | Mod: GP

## 2024-09-24 PROCEDURE — 2500000002 HC RX 250 W HCPCS SELF ADMINISTERED DRUGS (ALT 637 FOR MEDICARE OP, ALT 636 FOR OP/ED): Performed by: NURSE PRACTITIONER

## 2024-09-24 PROCEDURE — 97535 SELF CARE MNGMENT TRAINING: CPT | Mod: GO

## 2024-09-24 PROCEDURE — 2500000005 HC RX 250 GENERAL PHARMACY W/O HCPCS: Performed by: INTERNAL MEDICINE

## 2024-09-24 PROCEDURE — 99307 SBSQ NF CARE SF MDM 10: CPT | Performed by: STUDENT IN AN ORGANIZED HEALTH CARE EDUCATION/TRAINING PROGRAM

## 2024-09-24 PROCEDURE — 97116 GAIT TRAINING THERAPY: CPT | Mod: GP

## 2024-09-24 PROCEDURE — 1900000001 HC SKILLED SWING ROOM

## 2024-09-24 PROCEDURE — 97112 NEUROMUSCULAR REEDUCATION: CPT | Mod: GP

## 2024-09-24 PROCEDURE — 2500000001 HC RX 250 WO HCPCS SELF ADMINISTERED DRUGS (ALT 637 FOR MEDICARE OP): Performed by: NURSE PRACTITIONER

## 2024-09-24 PROCEDURE — 97530 THERAPEUTIC ACTIVITIES: CPT | Mod: GP

## 2024-09-24 PROCEDURE — 94760 N-INVAS EAR/PLS OXIMETRY 1: CPT

## 2024-09-24 ASSESSMENT — COGNITIVE AND FUNCTIONAL STATUS - GENERAL
STANDING UP FROM CHAIR USING ARMS: A LITTLE
MOVING TO AND FROM BED TO CHAIR: A LITTLE
DAILY ACTIVITIY SCORE: 20
DRESSING REGULAR UPPER BODY CLOTHING: A LITTLE
MOBILITY SCORE: 20
WALKING IN HOSPITAL ROOM: A LITTLE
STANDING UP FROM CHAIR USING ARMS: A LITTLE
WALKING IN HOSPITAL ROOM: A LITTLE
HELP NEEDED FOR BATHING: A LITTLE
TOILETING: A LITTLE
CLIMB 3 TO 5 STEPS WITH RAILING: A LITTLE
CLIMB 3 TO 5 STEPS WITH RAILING: A LITTLE
MOVING TO AND FROM BED TO CHAIR: A LITTLE
MOBILITY SCORE: 20
DRESSING REGULAR LOWER BODY CLOTHING: A LITTLE

## 2024-09-24 ASSESSMENT — PAIN SCALES - GENERAL
PAINLEVEL_OUTOF10: 7
PAINLEVEL_OUTOF10: 4
PAINLEVEL_OUTOF10: 7
PAINLEVEL_OUTOF10: 6
PAINLEVEL_OUTOF10: 2
PAINLEVEL_OUTOF10: 0 - NO PAIN
PAINLEVEL_OUTOF10: 7
PAINLEVEL_OUTOF10: 5 - MODERATE PAIN

## 2024-09-24 ASSESSMENT — PAIN - FUNCTIONAL ASSESSMENT
PAIN_FUNCTIONAL_ASSESSMENT: 0-10

## 2024-09-24 ASSESSMENT — ACTIVITIES OF DAILY LIVING (ADL): HOME_MANAGEMENT_TIME_ENTRY: 44

## 2024-09-24 NOTE — CARE PLAN
The patient's goals for the shift include      The clinical goals for the shift include Patient will tolerate PT/OT throughout the shift    Patient worked with PT/OT throughout the shift. Patient had tylenol and oxy for pain throughout the shift. Patient has lidocaine patch on lower right side rib. Patient chest tube dressing is clean and dry. Patient has been in the chair most of the day but gets in bed when his right side is bothering him too much. He had a good appetite today. Patient family is visiting currently, no needs at this time.

## 2024-09-24 NOTE — PROGRESS NOTES
Physical Therapy    Physical Therapy Treatment    Patient Name: Edward Andrade  MRN: [id number]  Department: [facility]  Room: [address] [place] [address]  Today's Date: 9/24/2024  Time Calculation  Start Time: 1447  Stop Time: 1525  Time Calculation (min): 38 min         Assessment/Plan   PT Assessment  End of Session Communication: Bedside nurse, PCT/NA/CTA  Assessment Comment: Pt tolerated session well. Pt challenged with stairs and balance exercises this date. Pt did not demo any major LOB during balance activities, but did demo mult episodes of retro lean and shuffling when standing in front of chair - requiring min a to correct at times. Will cont to practice w SPC at this time but may need to recommending quad cane for DC depending on pt's improvement in balance. Nursing notified of pt unsteadiness.  End of Session Patient Position: Up in chair, Alarm on     PT Plan  Treatment/Interventions: Bed mobility, Gait training, Transfer training, Stair training, Balance training, Neuromuscular re-education, Endurance training, Strengthening, Range of motion, Therapeutic exercise, Therapeutic activity, Home exercise program  PT Plan: Ongoing PT  PT Frequency: BID  PT Discharge Recommendations: Low intensity level of continued care  Equipment Recommended upon Discharge:  (TBD but likely cane)  PT Recommended Transfer Status: Assist x1, Assistive device  PT - OK to Discharge: Yes      General Visit Information:   PT  Visit  PT Received On: 09/24/24  Response to Previous Treatment: Patient with no complaints from previous session.  General  Prior to Session Communication: Bedside nurse  Patient Position Received: Bed, 2 rail up, Alarm off, not on at start of session  General Comment: Pt pleasant and agreeable to participate. Pt cleared by nursing staff. Left with call bell within reach and needs addressed.    Subjective   Precautions:  Precautions  Medical Precautions: Fall precautions  Precautions Comment: R 4th and 5th rib fx, R clavicle  fx, low gait belt, wound on R upper back      Objective   Pain:  Pain Assessment  Pain Assessment: 0-10  0-10 (Numeric) Pain Score: 4  Pain Type: Acute pain  Pain Location: Rib cage  Pain Orientation: Right  Pain Interventions: Distraction    Treatments:  Balance/Neuromuscular Re-Education  Balance/Neuromuscular Re-Education Activity 1: lateral and bwd stepping  to improve dynamic stepping. Pt able to perform lateral w/o UE support, but needing 2 fingers on robertson rail w vcs for step length on bwd stepping. 20 ft x 2 ea  Balance/Neuromuscular Re-Education Activity 2: pt ambulating around room picking up cones placed at various heights. Pt able to perform w ease and no LOB  Balance/Neuromuscular Re-Education Activity 3: pt using SPC to move towel on floor fwd/bwd and in half [place], focus on reaching outside SAILAJA w NBOS. vcs for technique at times but no major LOB during.  Balance/Neuromuscular Re-Education Activity 4: SLS w LUE on SPC, 1x10 1x15, 1x20 sec ea side. increased unsteadiness and ankle strategy but no LOB.    Bed Mobility  Bed Mobility: Yes  Bed Mobility 1  Bed Mobility 1: Supine to sitting  Level of Assistance 1: Distant supervision  Bed Mobility Comments 1: HOB raised slightly    Ambulation/Gait Training 1  Surface 1: Level tile  Device 1: Single point cane  Gait Support Devices: Gait belt  Assistance 1: Contact guard  Quality of Gait 1: Inconsistent stride length  Comments/Distance (ft) 1: fair sequencing, no LOB noted even w less supportive device. 1x100 ft and various other short distances  Transfer 1  Transfer From 1: Chair with arms to  Transfer to 1: Stand, Sit  Technique 1: Sit to stand, Stand to sit  Transfer Device 1: Gait belt, Cane  Transfer Level of Assistance 1: Distant supervision  Trials/Comments 1: mult trials. edu on how to perform w SPC. overall fair technique  Transfers 2  Transfer From 2: Toilet to  Transfer to 2: Stand, Sit  Technique 2: Stand to sit, Sit to stand  Transfer Device 2:  Cane, Gait belt  Transfer Level of Assistance 2: Distant supervision  Trials/Comments 2: 1 trial good hand placement and no LOB    Stairs  Rails 1: None (Comment)  Device 1: Single point cane  Support Devices 1: Gait belt  Assistance 1: Minimum assistance  Comment/Number of Steps 1: 2x2 steps. pt able to ascend d/t dificulity but losing balance mult times on descent and needing min A to recover. Also needing vcs for sequencing on descent     Object From Floor  Devices: Single point cane  Assist Level: Contact guard  Comments: pt picking mult items off ground w CGA and no LOB    Outcome Measures:  [facility] Basic Mobility  Turning from your back to your side while in a flat bed without using bedrails: None  Moving from lying on your back to sitting on the side of a flat bed without using bedrails: None  Moving to and from bed to chair (including a wheelchair): A little  Standing up from a chair using your arms (e.g. wheelchair or bedside chair): A little  To walk in hospital room: A little  Climbing 3-5 steps with railing: A little  Basic Mobility - Total Score: 20    Education Documentation  No documentation found.  Education Comments  No comments found.      Encounter Problems       Encounter Problems (Active)       PT Problem       pt will be able to ambulate >100 ft with FWW vs cane and modified independent  (Progressing)       Start:  09/21/24    Expected End:  10/05/24            Pt will be able to perform STS transfer with modified independent  (Progressing)       Start:  09/21/24    Expected End:  10/05/24            Pt will be able to perform all aspects of bed mobility with modified independent  (Progressing)       Start:  09/21/24    Expected End:  10/05/24            Pt will be able to navigate 3 steps with no hand rail and modified independent to allow for safe DC.   (Progressing)       Start:  09/21/24    Expected End:  10/05/24            Pt will demonstrate ability to  object from ground  from standing position, with LRAD and supervision (Progressing)       Start:  09/21/24    Expected End:  10/05/24               Pain - Adult

## 2024-09-24 NOTE — PROGRESS NOTES
09/24/24 1208   Discharge Planning   Assistance Needed Swing bed Meeting today   Expected Discharge Disposition Home H   Does the patient need discharge transport arranged? No     Weekly Swing Bed Meeting:    Present were: PT/OT/pt/RN    Accomplishments: Pt is making good progress with ADLS using cane. He is weaning off his oxygen and expects to go home without the need for supplemental O2.     Barriers:  Pt has not yet met his home safety goals. Insurance has authorized til 9/30 with an expected transition home by 10/2      Planned rehab focus for the following week: Work on endurance to get off O2, strengthen to mod I using cane.   SIGRID Betancourt

## 2024-09-24 NOTE — CARE PLAN
"  The clinical goals for the shift include patient will participate in ADLs as much as possible.    Pt able to make wants and needs known verbally. Relays pain of \"about 3-5\" to ribs but states does not want anything for it. Educated on staying ahead of pain and verbalized understanding. Uneventful shift. Call bell within reach, able to raise and lower head as needed. Lotion applied to BLE at HS. Encouraged to relate to staff any issues.   "

## 2024-09-24 NOTE — PROGRESS NOTES
Physical Therapy    Physical Therapy Treatment    Patient Name: Edward Andrade  MRN: [id number]  Department: [facility]  Room: [address] [place] [address]  Today's Date: 9/24/2024  Time Calculation  Start Time: 0803  Stop Time: 0836  Time Calculation (min): 33 min         Assessment/Plan   PT Assessment  End of Session Communication: Bedside nurse  Assessment Comment: Progressing.  End of Session Patient Position: Up in chair, Alarm on     PT Plan  Treatment/Interventions: Bed mobility, Gait training, Transfer training, Stair training, Balance training, Neuromuscular re-education, Endurance training, Strengthening, Range of motion, Therapeutic exercise, Therapeutic activity, Home exercise program  PT Plan: Ongoing PT  PT Frequency: BID  PT Discharge Recommendations: Low intensity level of continued care  Equipment Recommended upon Discharge:  (TBD but likely cane)  PT Recommended Transfer Status: Assist x1, Assistive device  PT - OK to Discharge: Yes    General Visit Information:   PT  Visit  PT Received On: 09/24/24  General  Prior to Session Communication: Bedside nurse  Patient Position Received: Up in chair, Alarm off, not on at start of session  General Comment: No new complaints. Feeling pretty good today.    Subjective   Precautions:  falls     Vital Signs (Past 2hrs)        Date/Time Vitals Session Patient Position Pulse Resp SpO2 BP MAP (mmHg)    09/24/24 0736 --  --  75  18  94 %  113/72  --    09/24/24 0803 --  --  88  --  98 %  --  --                  Vital Signs Comment: Monitorring O2 sats which did not drop below 91 with activity and generally remained 96 and up during session.     Objective   Pain:  Pain Assessment  Pain Assessment: 0-10  0-10 (Numeric) Pain Score: 0 - No pain  Cognition:  Cognition  Overall Cognitive Status: Within Functional Limits     Postural Control:  Static Sitting Balance  Static Sitting-Level of Assistance: Close supervision  Dynamic Standing Balance  Dynamic Standing-Level of Assistance: Close  supervision, Contact guard    Activity Tolerance:  Activity Tolerance  Activity Tolerance Comments: Increased ambulatory distance and difficulty of ther ex with stable vital signs.    Treatments:  Therapeutic Exercise  Therapeutic Exercise Performed: Yes  Therapeutic Exercise Activity 1: standing march 3x10  Therapeutic Exercise Activity 2: standing hip abduction 3x10  Therapeutic Exercise Activity 3: standing calf raise 3x10  Therapeutic Exercise Activity 4: sit to stand with LUE only 2x5    Ambulation/Gait Training  Ambulation/Gait Training Performed: Yes  Ambulation/Gait Training 1  Surface 1: Level tile  Device 1: Large base quad cane  Gait Support Devices: Gait belt  Assistance 1: Distant supervision  Quality of Gait 1:  (cues for placement of QC)  Comments/Distance (ft) 1: 6x20 ft  Ambulation/Gait Training 2  Surface 2: Level tile  Device 2: Dolan rail  Gait Support Devices: Gait belt  Assistance 2: Close supervision  Quality of Gait 2: Inconsistent stride length  Comments/Distance (ft) 2: 3x150 ft (1 self corrected balance loss during turn)  Transfer 1  Technique 1: Sit to stand, Stand to sit  Transfer Level of Assistance 1: Modified independent    Outcome Measures:  [facility] Basic Mobility  Turning from your back to your side while in a flat bed without using bedrails: None  Moving from lying on your back to sitting on the side of a flat bed without using bedrails: None  Moving to and from bed to chair (including a wheelchair): A little  Standing up from a chair using your arms (e.g. wheelchair or bedside chair): A little  To walk in hospital room: A little  Climbing 3-5 steps with railing: A little  Basic Mobility - Total Score: 20    Education Documentation  No documentation found.  Education Comments  No comments found.        OP EDUCATION:       Encounter Problems       Encounter Problems (Active)       PT Problem       pt will be able to ambulate >100 ft with FWW vs cane and modified independent   (Progressing)       Start:  09/21/24    Expected End:  10/05/24            Pt will be able to perform STS transfer with modified independent  (Progressing)       Start:  09/21/24    Expected End:  10/05/24            Pt will be able to perform all aspects of bed mobility with modified independent  (Progressing)       Start:  09/21/24    Expected End:  10/05/24            Pt will be able to navigate 3 steps with no hand rail and modified independent to allow for safe DC.   (Progressing)       Start:  09/21/24    Expected End:  10/05/24            Pt will demonstrate ability to  object from ground from standing position, with LRAD and supervision (Progressing)       Start:  09/21/24    Expected End:  10/05/24               Pain - Adult

## 2024-09-24 NOTE — PROGRESS NOTES
Occupational Therapy    Occupational Therapy Treatment    Name: Edward Andrade  MRN: [id number]  Department: [facility]  Room: [facility]-A  Date: 09/24/24  Time Calculation  Start Time: 0943  Stop Time: 1027  Time Calculation (min): 44 min    Assessment:  Patient making excellent progress towards goals. Anticipating meeting goals by end of week.   Evaluation/Treatment Tolerance: Patient tolerated treatment well  End of Session Communication: Bedside nurse  End of Session Patient Position: Up in chair, Alarm on  Plan:  Treatment Interventions: ADL retraining, Functional transfer training, UE strengthening/ROM, Endurance training, Patient/family training, Neuromuscular reeducation, Compensatory technique education  OT Frequency: 5 times per week  OT Discharge Recommendations: Low intensity level of continued care  OT Recommended Transfer Status: Assist of 1  OT - OK to Discharge: Yes    Subjective   Previous Visit Info:  OT Last Visit On: 09/23/24  OT Received On: 09/24/24  General:  General  Prior to Session Communication: Bedside nurse  Patient Position Received: Up in chair, Alarm on  General Comment: Pt eager to participate. Left with all needs in reach.  Precautions:  UE Weight Bearing Status: Other (Comment) (limiting WB through R arm d/t clavicle fracture)  Medical Precautions: Fall precautions  Precautions Comment: 2L O2, R 4th and 5th rib fx, R clavicle fx, low gait belt, wound on R upper back    Vital Signs (Past 2hrs)        Date/Time Vitals Session Patient Position Pulse Resp SpO2 BP MAP (mmHg)    09/24/24 0858 --  --  --  --  98 %  --  --    09/24/24 0943 During OT  --  --  --  98 %  --  --                Pain Assessment:  Pain Assessment  Pain Assessment: 0-10  0-10 (Numeric) Pain Score: 5 - Moderate pain  Pain Type: Acute pain  Pain Location: Rib cage  Pain Orientation: Right  Pain Interventions: Repositioned, Ambulation/increased activity (pt reporting not wanting to take medication, reporting not  inhibiting participation in therapy.)     Objective   Cognition:  Overall Cognitive Status: Within Functional Limits    Activities of Daily Living:   Grooming  Grooming Level of Assistance: Modified independent  Grooming Where Assessed: Standing sinkside  Grooming Comments: washing face, brushing teeth, shaving; no concerns    UE Dressing  UE Dressing Level of Assistance: Distant supervision, Minimal verbal cues  UE Dressing Where Assessed: Recliner  UE Dressing Comments: able to recall education from yesterday to easily don/doff shirt    Functional Standing Tolerance:  Functional Standing Tolerance  Time: 5 minutes  Activity: standing sinkside    Bed Mobility/Transfers:    Transfers  Transfer: Yes  Transfer 1  Transfer From 1: Chair with arms to  Transfer to 1: Stand, Sit  Technique 1: Sit to stand, Stand to sit  Transfer Device 1: Quad cane, Gait belt  Transfer Level of Assistance 1: Distant supervision  Transfers 2  Transfer From 2: Chair with arms to  Transfer to 2: Wheelchair  Technique 2: Sit to stand, Stand to sit  Transfer Device 2: Gait belt, Quad cane  Transfer Level of Assistance 2: Close supervision  Transfers 3  Transfer From 3: Wheelchair to  Transfer to 3: Car  Technique 3: Sit to stand, Stand to sit  Transfer Device 3: Quad cane, Cane, Gait belt  Transfer Level of Assistance 3: Close supervision  Trials/Comments 3: completed x2 with good safety    Tub Transfers  Tub Transfer From: Cane  Tub Transfer Type: To and from  Tub Transfer to: Transfer tub bench  Tub Transfer Technique: Ambulating  Tub Transfers: Modified independence  Tub Transfers Comments: educated on use of tub/bench for at home safety in shower. Pt eager to obtain for at home displaying good safety with use. (Educted on bathroom adaptions including grab bars and curtain to accomodate chair to good affect)    Functional Mobility:  Functional Mobility  Functional Mobility Performed: Yes  Functional Mobility 1  Surface 1: Level tile  Device  1: Single point cane, Large base quad cane  Functional Mobility Support Devices: Gait belt  Assistance 1: Close supervision  Comments 1: short distances during session; good safety throughout    Standing Balance:  Dynamic Standing Balance  Dynamic Standing-Level of Assistance: Distant supervision  Dynamic Standing-Balance:  (standing at sink for prolonged period of time to complete grooming)    Outcome Measures:  [facility] Daily Activity  Putting on and taking off regular lower body clothing: A little  Bathing (including washing, rinsing, drying): A little  Putting on and taking off regular upper body clothing: A little  Toileting, which includes using toilet, bedpan or urinal: A little  Taking care of personal grooming such as brushing teeth: None  Eating Meals: None  Daily Activity - Total Score: 20    Goals:  Encounter Problems       Encounter Problems (Active)       ADLs       Patient will perform UB and LB bathing with set-up and supervision level of assistance . (Progressing)       Start:  09/21/24    Expected End:  10/12/24            Patient with complete upper body dressing with modified independent level of assistance donning and doffing all UE clothes with PRN adaptive equipment following precautions (Progressing)       Start:  09/21/24    Expected End:  10/12/24            Patient with complete lower body dressing with modified independent level of assistance donning and doffing all LE clothes  with PRN adaptive equipment (Progressing)       Start:  09/21/24    Expected End:  10/12/24            Patient will complete daily grooming tasks brushing teeth, shaving, and washing face/hair with modified independent level of assistance  (Met)       Start:  09/21/24    Expected End:  10/12/24    Resolved:  09/24/24         Patient will complete toileting including hygiene clothing management/hygiene with modified independent level of assistance.       Start:  09/21/24    Expected End:  10/12/24                TRANSFERS       Patient will perform bed mobility modified independent level of assistance in order to improve safety and independence with mobility       Start:  09/21/24    Expected End:  10/12/24            Patient will complete functional transfer to chair, bed, toilet, shower, car with least restrictive device with modified independent level of assistance. (Progressing)       Start:  09/21/24    Expected End:  10/12/24

## 2024-09-24 NOTE — CARE PLAN
The clinical goals for the shift include Pt to relay to staff any issues    Pt had uneventful shift. Made wants and needs known verbally. Asks appropriate questions. Verbalizes understanding of answers and education given. Educated on staying ahead of the pain with fair effect. Encouraged to relay to staff any issues/pain. Lotion applied to BLE, dry flaky skin.

## 2024-09-25 LAB
ALBUMIN SERPL BCP-MCNC: 3.5 G/DL (ref 3.4–5)
ANION GAP SERPL CALC-SCNC: 11 MMOL/L (ref 10–20)
BUN SERPL-MCNC: 12 MG/DL (ref 6–23)
CALCIUM SERPL-MCNC: 9.6 MG/DL (ref 8.6–10.3)
CHLORIDE SERPL-SCNC: 103 MMOL/L (ref 98–107)
CHOLEST SERPL-MCNC: 242 MG/DL (ref 0–199)
CHOLESTEROL/HDL RATIO: 5.6
CO2 SERPL-SCNC: 29 MMOL/L (ref 21–32)
CREAT SERPL-MCNC: 0.87 MG/DL (ref 0.5–1.3)
EGFRCR SERPLBLD CKD-EPI 2021: >90 ML/MIN/1.73M*2
ERYTHROCYTE [DISTWIDTH] IN BLOOD BY AUTOMATED COUNT: 15.8 % (ref 11.5–14.5)
GLUCOSE SERPL-MCNC: 108 MG/DL (ref 74–99)
HCT VFR BLD AUTO: 37.2 % (ref 41–52)
HDLC SERPL-MCNC: 42.9 MG/DL
HGB BLD-MCNC: 11.7 G/DL (ref 13.5–17.5)
LDLC SERPL CALC-MCNC: 174 MG/DL
MAGNESIUM SERPL-MCNC: 1.97 MG/DL (ref 1.6–2.4)
MCH RBC QN AUTO: 31.1 PG (ref 26–34)
MCHC RBC AUTO-ENTMCNC: 31.5 G/DL (ref 32–36)
MCV RBC AUTO: 99 FL (ref 80–100)
NON HDL CHOLESTEROL: 199 MG/DL (ref 0–149)
NRBC BLD-RTO: 0 /100 WBCS (ref 0–0)
PHOSPHATE SERPL-MCNC: 3.6 MG/DL (ref 2.5–4.9)
PLATELET # BLD AUTO: 455 X10*3/UL (ref 150–450)
POTASSIUM SERPL-SCNC: 4.2 MMOL/L (ref 3.5–5.3)
RBC # BLD AUTO: 3.76 X10*6/UL (ref 4.5–5.9)
SODIUM SERPL-SCNC: 139 MMOL/L (ref 136–145)
TRIGL SERPL-MCNC: 124 MG/DL (ref 0–149)
VLDL: 25 MG/DL (ref 0–40)
WBC # BLD AUTO: 7.3 X10*3/UL (ref 4.4–11.3)

## 2024-09-25 PROCEDURE — 97116 GAIT TRAINING THERAPY: CPT | Mod: GP

## 2024-09-25 PROCEDURE — 80061 LIPID PANEL: CPT | Performed by: STUDENT IN AN ORGANIZED HEALTH CARE EDUCATION/TRAINING PROGRAM

## 2024-09-25 PROCEDURE — 80069 RENAL FUNCTION PANEL: CPT | Performed by: STUDENT IN AN ORGANIZED HEALTH CARE EDUCATION/TRAINING PROGRAM

## 2024-09-25 PROCEDURE — 97530 THERAPEUTIC ACTIVITIES: CPT | Mod: GP

## 2024-09-25 PROCEDURE — 2500000002 HC RX 250 W HCPCS SELF ADMINISTERED DRUGS (ALT 637 FOR MEDICARE OP, ALT 636 FOR OP/ED): Performed by: NURSE PRACTITIONER

## 2024-09-25 PROCEDURE — 97110 THERAPEUTIC EXERCISES: CPT | Mod: GP

## 2024-09-25 PROCEDURE — 97535 SELF CARE MNGMENT TRAINING: CPT | Mod: GO

## 2024-09-25 PROCEDURE — 94760 N-INVAS EAR/PLS OXIMETRY 1: CPT

## 2024-09-25 PROCEDURE — 83735 ASSAY OF MAGNESIUM: CPT | Performed by: STUDENT IN AN ORGANIZED HEALTH CARE EDUCATION/TRAINING PROGRAM

## 2024-09-25 PROCEDURE — 2500000005 HC RX 250 GENERAL PHARMACY W/O HCPCS: Performed by: NURSE PRACTITIONER

## 2024-09-25 PROCEDURE — 2500000005 HC RX 250 GENERAL PHARMACY W/O HCPCS: Performed by: STUDENT IN AN ORGANIZED HEALTH CARE EDUCATION/TRAINING PROGRAM

## 2024-09-25 PROCEDURE — 2500000001 HC RX 250 WO HCPCS SELF ADMINISTERED DRUGS (ALT 637 FOR MEDICARE OP)

## 2024-09-25 PROCEDURE — 97112 NEUROMUSCULAR REEDUCATION: CPT | Mod: GO

## 2024-09-25 PROCEDURE — 85027 COMPLETE CBC AUTOMATED: CPT | Performed by: STUDENT IN AN ORGANIZED HEALTH CARE EDUCATION/TRAINING PROGRAM

## 2024-09-25 PROCEDURE — 2500000001 HC RX 250 WO HCPCS SELF ADMINISTERED DRUGS (ALT 637 FOR MEDICARE OP): Performed by: NURSE PRACTITIONER

## 2024-09-25 PROCEDURE — 36415 COLL VENOUS BLD VENIPUNCTURE: CPT | Performed by: STUDENT IN AN ORGANIZED HEALTH CARE EDUCATION/TRAINING PROGRAM

## 2024-09-25 PROCEDURE — 1900000001 HC SKILLED SWING ROOM

## 2024-09-25 RX ORDER — LIDOCAINE 560 MG/1
1 PATCH PERCUTANEOUS; TOPICAL; TRANSDERMAL DAILY
Status: DISCONTINUED | OUTPATIENT
Start: 2024-09-25 | End: 2024-09-27 | Stop reason: HOSPADM

## 2024-09-25 ASSESSMENT — COGNITIVE AND FUNCTIONAL STATUS - GENERAL
MOBILITY SCORE: 17
DRESSING REGULAR LOWER BODY CLOTHING: A LITTLE
DRESSING REGULAR UPPER BODY CLOTHING: A LITTLE
DAILY ACTIVITIY SCORE: 19
MOBILITY SCORE: 16
STANDING UP FROM CHAIR USING ARMS: A LOT
WALKING IN HOSPITAL ROOM: A LITTLE
DRESSING REGULAR UPPER BODY CLOTHING: A LITTLE
HELP NEEDED FOR BATHING: A LITTLE
WALKING IN HOSPITAL ROOM: A LITTLE
MOVING TO AND FROM BED TO CHAIR: A LITTLE
STANDING UP FROM CHAIR USING ARMS: A LITTLE
CLIMB 3 TO 5 STEPS WITH RAILING: A LOT
WALKING IN HOSPITAL ROOM: A LITTLE
MOVING TO AND FROM BED TO CHAIR: A LITTLE
STANDING UP FROM CHAIR USING ARMS: A LITTLE
CLIMB 3 TO 5 STEPS WITH RAILING: A LITTLE
TURNING FROM BACK TO SIDE WHILE IN FLAT BAD: A LITTLE
TOILETING: A LITTLE
TOILETING: A LITTLE
DAILY ACTIVITIY SCORE: 19
MOVING TO AND FROM BED TO CHAIR: A LITTLE
STANDING UP FROM CHAIR USING ARMS: A LITTLE
PERSONAL GROOMING: A LITTLE
DRESSING REGULAR LOWER BODY CLOTHING: A LITTLE
MOBILITY SCORE: 20
MOVING FROM LYING ON BACK TO SITTING ON SIDE OF FLAT BED WITH BEDRAILS: A LITTLE
MOVING FROM LYING ON BACK TO SITTING ON SIDE OF FLAT BED WITH BEDRAILS: A LITTLE
HELP NEEDED FOR BATHING: A LITTLE
TOILETING: A LITTLE
WALKING IN HOSPITAL ROOM: A LITTLE
MOBILITY SCORE: 20
HELP NEEDED FOR BATHING: A LITTLE
PERSONAL GROOMING: A LITTLE
MOVING TO AND FROM BED TO CHAIR: A LITTLE
CLIMB 3 TO 5 STEPS WITH RAILING: A LOT
DAILY ACTIVITIY SCORE: 21
DRESSING REGULAR LOWER BODY CLOTHING: A LITTLE
CLIMB 3 TO 5 STEPS WITH RAILING: A LITTLE
TURNING FROM BACK TO SIDE WHILE IN FLAT BAD: A LITTLE

## 2024-09-25 ASSESSMENT — PAIN DESCRIPTION - ORIENTATION
ORIENTATION: RIGHT;ANTERIOR
ORIENTATION: RIGHT

## 2024-09-25 ASSESSMENT — PAIN - FUNCTIONAL ASSESSMENT
PAIN_FUNCTIONAL_ASSESSMENT: 0-10

## 2024-09-25 ASSESSMENT — ACTIVITIES OF DAILY LIVING (ADL): HOME_MANAGEMENT_TIME_ENTRY: 26

## 2024-09-25 ASSESSMENT — PAIN SCALES - GENERAL
PAINLEVEL_OUTOF10: 6
PAINLEVEL_OUTOF10: 3
PAINLEVEL_OUTOF10: 0 - NO PAIN
PAINLEVEL_OUTOF10: 8
PAINLEVEL_OUTOF10: 0 - NO PAIN
PAINLEVEL_OUTOF10: 3
PAINLEVEL_OUTOF10: 8

## 2024-09-25 ASSESSMENT — PAIN DESCRIPTION - LOCATION
LOCATION: RIB CAGE
LOCATION: SHOULDER

## 2024-09-25 NOTE — CARE PLAN
The clinical goals for the shift include Pt to report decreased pain, elevate BLE    Pt had uneventful shift except oxygen did dip to 68 for about 10 seconds. Oxygen re-applied per patient request and concern for drop. Pt noted to be snoring softly and denied having sleep apnea. Pt stated feeling better, getting stronger. Ambulating with SBA and cane. Pt did not sleep much per patient due to concern of changing oxygen. Educated to tell staff anytime has concerns so they can be addressed. Good progress, medication compliant. Pt c/o hemorrhoid, PRN ordered and will be applied once pharmacy delivers to unit.

## 2024-09-25 NOTE — PROGRESS NOTES
Physical Therapy    Physical Therapy Treatment    Patient Name: Edward Andrade  MRN: [id number]  Department: [facility]  Room: [address] [place] [address]  Today's Date: 9/25/2024  Time Calculation  Start Time: 1300  Stop Time: 1341  Time Calculation (min): 41 min    Assessment/Plan   PT Assessment  End of Session Communication: Bedside nurse  Assessment Comment: Improving with straight cane noting only 2 slight balance losses both easily self corrected.  End of Session Patient Position: Up in chair, Alarm on     PT Plan  Treatment/Interventions: Bed mobility, Gait training, Transfer training, Stair training, Balance training, Neuromuscular re-education, Endurance training, Strengthening, Range of motion, Therapeutic exercise, Therapeutic activity, Home exercise program  PT Plan: Ongoing PT  PT Frequency: BID  PT Discharge Recommendations: Low intensity level of continued care  Equipment Recommended upon Discharge:  (TBD but likely cane)  PT Recommended Transfer Status: Assist x1, Assistive device  PT - OK to Discharge: Yes    General Visit Information:   PT  Visit  PT Received On: 09/25/24  General  Prior to Session Communication: Bedside nurse  Patient Position Received: Bed, 2 rail up, Alarm off, not on at start of session  General Comment: Patient welcoming therapy session. He has been resting in bed since lunch.    Subjective   Precautions:  Precautions  Medical Precautions: Fall precautions    Vital Signs (Past 2hrs)        Date/Time Vitals Session Patient Position Pulse Resp SpO2 BP MAP (mmHg)    09/25/24 1300 --  --  88  --  97 %  --  --                  Vital Signs Comment: O2 off this AM though pt was placed on it overnight. O2 remaining in mid to high 90s even with activity     Objective   Pain:  Pain Assessment  Pain Assessment: 0-10  0-10 (Numeric) Pain Score: 0 - No pain     Postural Control:  Dynamic Standing Balance  Dynamic Standing-Level of Assistance: Close supervision    Activity Tolerance:  Activity  Tolerance  Endurance: Tolerates 10 - 20 min exercise with multiple rests  Activity Tolerance Comments: Improving daily.    Treatments:  Therapeutic Exercise  Therapeutic Exercise Performed: Yes  Therapeutic Exercise Activity 1: Supine:glute bridge 3x10  Therapeutic Exercise Activity 2: hip and knee flexion 3x10  Therapeutic Exercise Activity 3: L SL clamshell 3x10  Therapeutic Exercise Activity 4: Sit to stand from bedside with LUE assist 3x5    Bed Mobility  Bed Mobility: Yes  Bed Mobility 1  Bed Mobility 1: Supine to sitting  Level of Assistance 1: Modified independent  Bed Mobility Comments 1: Using L bed rail to assist    Ambulation/Gait Training  Ambulation/Gait Training Performed: Yes  Ambulation/Gait Training 1  Surface 1: Level tile  Device 1: Single point cane  Gait Support Devices: Gait belt  Assistance 1: Close supervision, Moderate verbal cues  Quality of Gait 1: Diminished heel strike  Comments/Distance (ft) 1: 3x150 ft (Focusing on gait cycle syncronizing device with RLE footstrike.)  Transfer 1  Transfer From 1: Chair with arms to  Transfer to 1: Stand  Technique 1: Sit to stand, Stand to sit  Transfer Device 1: Cane  Transfer Level of Assistance 1: Modified independent  Trials/Comments 1: Reminders to control descent eccentrically to improve LE strength and safety.    Outcome Measures:  [facility] Basic Mobility  Turning from your back to your side while in a flat bed without using bedrails: None  Moving from lying on your back to sitting on the side of a flat bed without using bedrails: None  Moving to and from bed to chair (including a wheelchair): A little  Standing up from a chair using your arms (e.g. wheelchair or bedside chair): A little  To walk in hospital room: A little  Climbing 3-5 steps with railing: A little  Basic Mobility - Total Score: 20    Education Documentation  No documentation found.  Education Comments  No comments found.        OP EDUCATION:       Encounter Problems        Encounter Problems (Active)       PT Problem       pt will be able to ambulate >100 ft with FWW vs cane and modified independent  (Progressing)       Start:  09/21/24    Expected End:  10/05/24            Pt will be able to perform STS transfer with modified independent  (Progressing)       Start:  09/21/24    Expected End:  10/05/24            Pt will be able to perform all aspects of bed mobility with modified independent  (Progressing)       Start:  09/21/24    Expected End:  10/05/24            Pt will be able to navigate 3 steps with no hand rail and modified independent to allow for safe DC.   (Progressing)       Start:  09/21/24    Expected End:  10/05/24            Pt will demonstrate ability to  object from ground from standing position, with LRAD and supervision (Progressing)       Start:  09/21/24    Expected End:  10/05/24               Pain - Adult

## 2024-09-25 NOTE — PROGRESS NOTES
Physical Therapy    Physical Therapy Treatment    Patient Name: Edward Andrade  MRN: [id number]  Department: [facility]  Room: [address] [place] [address]  Today's Date: 9/25/2024  Time Calculation  Start Time: 0753  Stop Time: 0820  Time Calculation (min): 27 min         Assessment/Plan   PT Assessment  End of Session Communication: Bedside nurse, PCT/NA/CTA  Assessment Comment: Pt tolerated session well. Pt w increased fatigue this date d/t not sleeping well. Improved performance on stairs but continues w mult small LOBs sporadically. Needs cont practice/intervention to decrease fall risk for home going.  End of Session Patient Position: Up in chair, Alarm on     PT Plan  Treatment/Interventions: Bed mobility, Gait training, Transfer training, Stair training, Balance training, Neuromuscular re-education, Endurance training, Strengthening, Range of motion, Therapeutic exercise, Therapeutic activity, Home exercise program  PT Plan: Ongoing PT  PT Frequency: BID  PT Discharge Recommendations: Low intensity level of continued care  Equipment Recommended upon Discharge:  (TBD but likely cane)  PT Recommended Transfer Status: Assist x1, Assistive device  PT - OK to Discharge: Yes      General Visit Information:   PT  Visit  PT Received On: 09/25/24  Response to Previous Treatment: Patient with no complaints from previous session.  General  Prior to Session Communication: Bedside nurse  Patient Position Received: Up in chair, Alarm on  General Comment: Pt pleasant and agreeable to participate. Pt cleared by nursing staff. Left with call bell within reach and needs addressed.    Subjective   Precautions:  Precautions  Medical Precautions: Fall precautions  Precautions Comment: R 4th and 5th rib fx, R clavicle fx, low gait belt, wound on R upper back    Vital Signs Comment: O2 off this AM though pt was placed on it overnight. O2 remaining in mid to high 90s even with activity     Objective   Pain:  Pain Assessment  Pain Assessment: 0-10  0-10  (Numeric) Pain Score: 0 - No pain  Pain Type: Acute pain  Pain Location: Rib cage  Pain Orientation: Right  Pain Interventions: Distraction  Cognition:  Cognition  Overall Cognitive Status: Within Functional Limits    Treatments:  Therapeutic Exercise  Therapeutic Exercise Activity 1: 1x10 STS w emphasis on building endurance  Therapeutic Exercise Activity 2: 2x10 heel raises  Therapeutic Exercise Activity 3: 2x10 hip abd - increased difficulty on RLE d/t poor SLS/balance. improving w cont practice for 2nd set. 1 LOB noted w pt able to self correct  Therapeutic Exercise Activity 4: 2x20 alt high knees w slow speed. mult small LOBs during activity. Pt able to self correct as needed but was moderately unsteady.    Ambulation/Gait Training 1  Surface 1: Level tile  Device 1: Single point cane  Gait Support Devices: Gait belt  Assistance 1: Close supervision  Quality of Gait 1: Inconsistent stride length  Comments/Distance (ft) 1: 1x120 and various other short distances. no LOB on longer trials but 1 LOB noted when pt attempted to stand still to check his O2 levels. min A from PT to correct.  Transfer 1  Transfer From 1: Chair with arms to  Transfer to 1: Stand, Sit  Technique 1: Sit to stand, Stand to sit  Transfer Device 1: Gait belt, Cane  Transfer Level of Assistance 1: Distant supervision  Trials/Comments 1: overall fair technique  Transfers 2  Transfer From 2: Toilet to  Transfer to 2: Stand, Sit  Technique 2: Stand to sit, Sit to stand  Transfer Device 2: Cane, Gait belt  Transfer Level of Assistance 2: Distant supervision  Trials/Comments 2: 1 trial good hand placement and no LOB    Stairs  Rails 1: None (Comment)  Device 1: Single point cane  Support Devices 1: Gait belt  Assistance 1: Contact guard  Comment/Number of Steps 1: 2x2 6'' steps w cane. Pt asking PT to give him more space on stairs this AM, no LOBs noted this date. following these trials pt stated that he does have a 2nd front door w stairs/rails to  enter that he would like to try to prepare for at home. improved performance compared to yesterdays session.    Outcome Measures:  [facility] Basic Mobility  Turning from your back to your side while in a flat bed without using bedrails: None  Moving from lying on your back to sitting on the side of a flat bed without using bedrails: None  Moving to and from bed to chair (including a wheelchair): A little  Standing up from a chair using your arms (e.g. wheelchair or bedside chair): A little  To walk in hospital room: A little  Climbing 3-5 steps with railing: A little  Basic Mobility - Total Score: 20    Education Documentation  No documentation found.  Education Comments  No comments found.        Encounter Problems       Encounter Problems (Active)       PT Problem       pt will be able to ambulate >100 ft with FWW vs cane and modified independent  (Progressing)       Start:  09/21/24    Expected End:  10/05/24            Pt will be able to perform STS transfer with modified independent  (Progressing)       Start:  09/21/24    Expected End:  10/05/24            Pt will be able to perform all aspects of bed mobility with modified independent  (Progressing)       Start:  09/21/24    Expected End:  10/05/24            Pt will be able to navigate 3 steps with no hand rail and modified independent to allow for safe DC.   (Progressing)       Start:  09/21/24    Expected End:  10/05/24            Pt will demonstrate ability to  object from ground from standing position, with LRAD and supervision (Progressing)       Start:  09/21/24    Expected End:  10/05/24               Pain - Adult

## 2024-09-25 NOTE — SIGNIFICANT EVENT
09/25/24 1603   Wound 09/25/24 Flank Right;Upper   Date First Assessed/Time First Assessed: 09/25/24 1603   Location: Flank  Wound Location Orientation: Right;Upper   Wound Image Images linked   Shape    (Dehiscence noticed on bottom stitch in left corner. )   Drainage Description None   Drainage Amount None   Dressing Other (Comment)  (Bordered gauze  )   Dressing Changed New   Dressing Status Clean;Dry;Occlusive      Patient had occlusive dressing >10 days. Dressing removed, picture is what is present. Stitching doesnt appear to be dissolvable. Dehiscence present in lower stitching on left corner. No drainage present. No orders from previous facility on care or removal of stitching. Will continue to monitor site for signs of infection.

## 2024-09-25 NOTE — CARE PLAN
The patient's goals for the shift include      The clinical goals for the shift include Pt to report decreased pain    Pt ate breakfast, lunch and dinner. Worked with OT and PT throughout shift. Uses call bell when needing assistance. Patient is a standby assist to the bathroom. Patient reported 3/10 pain in the early morning with OT and was given tylenol. Reported 8/10 pain around 1700 and requested Oxy PO. Removed dressing from chest tube removal from 10 days ago. Some dehiscence and no s/s of infection noted. Recovered wound. Details of incision/sutures found in notes. Patient has been pleasant throughout shift. Patient sitting in chair with call bell within reach.

## 2024-09-25 NOTE — PROGRESS NOTES
Occupational Therapy    Occupational Therapy Treatment    Name: Edward Andrade  MRN: [id number]  Department: [facility] 3  Room: [facility]-A  Date: 09/25/24  Time Calculation  Start Time: 0846  Stop Time: 0927  Time Calculation (min): 41 min    Assessment:  Evaluation/Treatment Tolerance: Patient tolerated treatment well  End of Session Communication: Bedside nurse, PCT/NA/CTA  End of Session Patient Position: Up in chair, Alarm on  Plan:  Treatment Interventions: ADL retraining, Functional transfer training, UE strengthening/ROM, Endurance training, Patient/family training, Neuromuscular reeducation, Compensatory technique education  OT Frequency: 5 times per week  OT Discharge Recommendations: Low intensity level of continued care  OT Recommended Transfer Status: Assist of 1  OT - OK to Discharge: Yes    Subjective   Previous Visit Info:  OT Received On: 09/25/24  General:  General  Prior to Session Communication: Bedside nurse  Patient Position Received: Up in chair, Alarm on  General Comment: Pt pleseant and cooperative throughout session. Left with all needs in reach following.  Precautions:  Medical Precautions: Fall precautions  Precautions Comment: R 4th and 5th rib fx, R clavicle fx, low gait belt, wound on R upper back  Pain Assessment:  Pain Assessment  Pain Assessment: 0-10  0-10 (Numeric) Pain Score: 3  Pain Type: Chronic pain  Pain Location: Rib cage  Pain Orientation: Right     Objective   Cognition:  Overall Cognitive Status: Within Functional Limits  Activities of Daily Living:   UE Dressing  UE Dressing Level of Assistance: Modified independent  UE Dressing Where Assessed: Recliner    LE Dressing  LE Dressing: Yes  Pants Level of Assistance: Distant supervision  Sock Level of Assistance: Dependent (unable to complete without assistive device, will address next session)  Shoe Level of Assistance: Moderate assistance (able to get shoes on, unable to tie, will address with elastic laces next  session.)  Adult Briefs Level of Assistance: Distant supervision  LE Dressing Where Assessed: Recliner     Bed Mobility/Transfers:   Transfer 1  Transfer From 1: Chair with arms to  Transfer to 1: Stand, Sit  Technique 1: Sit to stand, Stand to sit  Transfer Device 1: Gait belt, Cane  Transfer Level of Assistance 1: Distant supervision  Trials/Comments 1: multiple completed thrughout session at same level of assistance.     Therapy/Activity:   Therapeutic Activity  Therapeutic Activity Performed: Yes  Therapeutic Activity 1: Eduated patient on [place] percieved exertion scale. Educated on ECT for activities. Patient able to talk through senarios with OT and rate them. Scale left with patient, patient awknowledging understanding.  Therapeutic Activity 2: item retrieval using QC, minor LOB with patient able to self correct; CGA throughout    Balance/Neuromuscular Re-Education  Balance/Neuromuscular Re-Education Activity Performed: Yes  Balance/Neuromuscular Re-Education Activity 1: Multiple variations of step ups on foam pad forward and laterally, alternating feet. Pt having intermitant difficulty clearing foam pad though sabino to correct with VC. CGA throughout.  Balance/Neuromuscular Re-Education Activity 2: Ball toss in standing 2x20; supervision throughout withg no LOB.    Outcome Measures:  [facility] Daily Activity  Putting on and taking off regular lower body clothing: A little  Bathing (including washing, rinsing, drying): A little  Putting on and taking off regular upper body clothing: None  Toileting, which includes using toilet, bedpan or urinal: A little  Taking care of personal grooming such as brushing teeth: None  Eating Meals: None  Daily Activity - Total Score: 21    Goals:  Encounter Problems       Encounter Problems (Active)       ADLs       Patient will perform UB and LB bathing with set-up and supervision level of assistance . (Progressing)       Start:  09/21/24    Expected End:  10/12/24             Patient with complete upper body dressing with modified independent level of assistance donning and doffing all UE clothes with PRN adaptive equipment following precautions (Met)       Start:  09/21/24    Expected End:  10/12/24    Resolved:  09/25/24         Patient with complete lower body dressing with modified independent level of assistance donning and doffing all LE clothes  with PRN adaptive equipment (Progressing)       Start:  09/21/24    Expected End:  10/12/24            Patient will complete daily grooming tasks brushing teeth, shaving, and washing face/hair with modified independent level of assistance  (Met)       Start:  09/21/24    Expected End:  10/12/24    Resolved:  09/24/24         Patient will complete toileting including hygiene clothing management/hygiene with modified independent level of assistance.       Start:  09/21/24    Expected End:  10/12/24               TRANSFERS       Patient will perform bed mobility modified independent level of assistance in order to improve safety and independence with mobility       Start:  09/21/24    Expected End:  10/12/24            Patient will complete functional transfer to chair, bed, toilet, shower, car with least restrictive device with modified independent level of assistance. (Progressing)       Start:  09/21/24    Expected End:  10/12/24

## 2024-09-26 PROCEDURE — 1900000001 HC SKILLED SWING ROOM

## 2024-09-26 PROCEDURE — 97112 NEUROMUSCULAR REEDUCATION: CPT | Mod: GP

## 2024-09-26 PROCEDURE — 97110 THERAPEUTIC EXERCISES: CPT | Mod: GP

## 2024-09-26 PROCEDURE — 2500000002 HC RX 250 W HCPCS SELF ADMINISTERED DRUGS (ALT 637 FOR MEDICARE OP, ALT 636 FOR OP/ED): Performed by: NURSE PRACTITIONER

## 2024-09-26 PROCEDURE — 2500000005 HC RX 250 GENERAL PHARMACY W/O HCPCS: Performed by: NURSE PRACTITIONER

## 2024-09-26 PROCEDURE — 2500000001 HC RX 250 WO HCPCS SELF ADMINISTERED DRUGS (ALT 637 FOR MEDICARE OP): Performed by: NURSE PRACTITIONER

## 2024-09-26 PROCEDURE — 97112 NEUROMUSCULAR REEDUCATION: CPT | Mod: GO

## 2024-09-26 PROCEDURE — 97530 THERAPEUTIC ACTIVITIES: CPT | Mod: GP

## 2024-09-26 PROCEDURE — 2500000005 HC RX 250 GENERAL PHARMACY W/O HCPCS: Performed by: STUDENT IN AN ORGANIZED HEALTH CARE EDUCATION/TRAINING PROGRAM

## 2024-09-26 PROCEDURE — 97535 SELF CARE MNGMENT TRAINING: CPT | Mod: GO

## 2024-09-26 PROCEDURE — 97116 GAIT TRAINING THERAPY: CPT | Mod: GP

## 2024-09-26 PROCEDURE — 94760 N-INVAS EAR/PLS OXIMETRY 1: CPT

## 2024-09-26 SDOH — ECONOMIC STABILITY: FOOD INSECURITY: WITHIN THE PAST 12 MONTHS, YOU WORRIED THAT YOUR FOOD WOULD RUN OUT BEFORE YOU GOT MONEY TO BUY MORE.: NEVER TRUE

## 2024-09-26 SDOH — ECONOMIC STABILITY: INCOME INSECURITY: IN THE PAST 12 MONTHS, HAS THE ELECTRIC, GAS, OIL, OR WATER COMPANY THREATENED TO SHUT OFF SERVICE IN YOUR HOME?: NO

## 2024-09-26 SDOH — SOCIAL STABILITY: SOCIAL INSECURITY: WITHIN THE LAST YEAR, HAVE YOU BEEN HUMILIATED OR EMOTIONALLY ABUSED IN OTHER WAYS BY YOUR PARTNER OR EX-PARTNER?: NO

## 2024-09-26 SDOH — SOCIAL STABILITY: SOCIAL INSECURITY
WITHIN THE LAST YEAR, HAVE YOU BEEN KICKED, HIT, SLAPPED, OR OTHERWISE PHYSICALLY HURT BY YOUR PARTNER OR EX-PARTNER?: NO

## 2024-09-26 SDOH — ECONOMIC STABILITY: FOOD INSECURITY: WITHIN THE PAST 12 MONTHS, THE FOOD YOU BOUGHT JUST DIDN'T LAST AND YOU DIDN'T HAVE MONEY TO GET MORE.: NEVER TRUE

## 2024-09-26 SDOH — SOCIAL STABILITY: SOCIAL INSECURITY: WITHIN THE LAST YEAR, HAVE YOU BEEN AFRAID OF YOUR PARTNER OR EX-PARTNER?: NO

## 2024-09-26 SDOH — SOCIAL STABILITY: SOCIAL INSECURITY
WITHIN THE LAST YEAR, HAVE TO BEEN RAPED OR FORCED TO HAVE ANY KIND OF SEXUAL ACTIVITY BY YOUR PARTNER OR EX-PARTNER?: NO

## 2024-09-26 ASSESSMENT — PAIN SCALES - GENERAL
PAINLEVEL_OUTOF10: 5 - MODERATE PAIN
PAINLEVEL_OUTOF10: 0 - NO PAIN
PAINLEVEL_OUTOF10: 0 - NO PAIN
PAINLEVEL_OUTOF10: 8
PAINLEVEL_OUTOF10: 1
PAINLEVEL_OUTOF10: 6
PAINLEVEL_OUTOF10: 0 - NO PAIN
PAINLEVEL_OUTOF10: 0 - NO PAIN

## 2024-09-26 ASSESSMENT — COGNITIVE AND FUNCTIONAL STATUS - GENERAL
TOILETING: A LITTLE
HELP NEEDED FOR BATHING: A LITTLE
PERSONAL GROOMING: A LITTLE
WALKING IN HOSPITAL ROOM: A LITTLE
TURNING FROM BACK TO SIDE WHILE IN FLAT BAD: A LITTLE
MOBILITY SCORE: 22
HELP NEEDED FOR BATHING: A LITTLE
TURNING FROM BACK TO SIDE WHILE IN FLAT BAD: A LITTLE
HELP NEEDED FOR BATHING: A LITTLE
MOVING TO AND FROM BED TO CHAIR: A LITTLE
MOVING TO AND FROM BED TO CHAIR: A LITTLE
MOBILITY SCORE: 17
WALKING IN HOSPITAL ROOM: A LITTLE
DAILY ACTIVITIY SCORE: 19
TOILETING: A LITTLE
MOVING FROM LYING ON BACK TO SITTING ON SIDE OF FLAT BED WITH BEDRAILS: A LITTLE
CLIMB 3 TO 5 STEPS WITH RAILING: A LOT
MOVING TO AND FROM BED TO CHAIR: A LITTLE
WALKING IN HOSPITAL ROOM: A LITTLE
STANDING UP FROM CHAIR USING ARMS: A LITTLE
STANDING UP FROM CHAIR USING ARMS: A LITTLE
MOBILITY SCORE: 17
DRESSING REGULAR UPPER BODY CLOTHING: A LITTLE
CLIMB 3 TO 5 STEPS WITH RAILING: A LOT
DRESSING REGULAR LOWER BODY CLOTHING: A LITTLE
TOILETING: A LITTLE
MOBILITY SCORE: 21
DAILY ACTIVITIY SCORE: 21
CLIMB 3 TO 5 STEPS WITH RAILING: A LITTLE
DRESSING REGULAR LOWER BODY CLOTHING: A LITTLE
WALKING IN HOSPITAL ROOM: A LITTLE
MOVING FROM LYING ON BACK TO SITTING ON SIDE OF FLAT BED WITH BEDRAILS: A LITTLE
DRESSING REGULAR UPPER BODY CLOTHING: A LITTLE
MOVING TO AND FROM BED TO CHAIR: A LITTLE
DRESSING REGULAR LOWER BODY CLOTHING: A LITTLE
DAILY ACTIVITIY SCORE: 19
PERSONAL GROOMING: A LITTLE

## 2024-09-26 ASSESSMENT — PAIN - FUNCTIONAL ASSESSMENT
PAIN_FUNCTIONAL_ASSESSMENT: 0-10

## 2024-09-26 ASSESSMENT — ACTIVITIES OF DAILY LIVING (ADL): HOME_MANAGEMENT_TIME_ENTRY: 20

## 2024-09-26 ASSESSMENT — PAIN DESCRIPTION - LOCATION
LOCATION: RIB CAGE
LOCATION: RIB CAGE
LOCATION: GENERALIZED

## 2024-09-26 ASSESSMENT — PAIN DESCRIPTION - ORIENTATION
ORIENTATION: RIGHT

## 2024-09-26 NOTE — PROGRESS NOTES
Occupational Therapy    Occupational Therapy Treatment    Name: Edward Andrade  MRN: [id number]  Department: [facility] 3  Room: [address] [place] [address]-A  Date: 09/26/24  Time Calculation  Start Time: 1350  Stop Time: 1425  Time Calculation (min): 35 min    Assessment:  Evaluation/Treatment Tolerance: Patient tolerated treatment well  End of Session Communication: Bedside nurse  End of Session Patient Position: Up in chair, Alarm on  Plan:  Treatment Interventions: ADL retraining, Functional transfer training, UE strengthening/ROM, Endurance training, Patient/family training, Neuromuscular reeducation, Compensatory technique education  OT Frequency: 5 times per week  OT Discharge Recommendations: Low intensity level of continued care  OT Recommended Transfer Status: Assist of 1  OT - OK to Discharge: Yes    Subjective   Previous Visit Info:  OT Last Visit On: 09/25/24  OT Received On: 09/26/24    General:  General  Prior to Session Communication: Bedside nurse  Patient Position Received: Up in chair, Alarm on  General Comment: Pt pleseant and coperative throughout session.    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  UE Weight Bearing Status: Other (Comment) (limited WB through R UE d/t clavicle fx)  Medical Precautions: Fall precautions  Precautions Comment: R 4th and 5th rib fx, R clavicle fx, low gait belt, wound on R upper back    Pain Assessment:  Pain Assessment  Pain Assessment: 0-10  0-10 (Numeric) Pain Score: 0 - No pain     Objective   Cognition:  Overall Cognitive Status: Within Functional Limits    Activities of Daily Living:   LE Dressing  LE Dressing: Yes  LE Dressing Adaptive Equipment: Sock aide, Elastic laces  Sock Level of Assistance: Modified independent (using sock aide; educated about purchasing for at home)  Shoe Level of Assistance: Modified independent (once educated on and elastic laces donned, no issues)  LE Dressing Where Assessed: Recliner     Bed Mobility/Transfers:    Transfers  Transfer: Yes  Transfer 1  Transfer From 1: Chair with arms to  Transfer to 1: Stand, Sit  Technique 1: Sit to stand, Stand to sit  Transfer Device 1: Cane  Transfer Level of Assistance 1: Modified independent  Trials/Comments 1: multiple repetitions throughout session with no issues.    Functional Mobility:  Functional Mobility  Functional Mobility Performed: Yes  Functional Mobility 1  Surface 1: Level tile  Device 1: Single point cane  Functional Mobility Support Devices: Gait belt  Assistance 1: Distant supervision     Therapy/Activity:   Therapeutic Activity  Therapeutic Activity Performed: Yes  Therapeutic Activity 1: item retrival using SC and various leveled surfaces including the ground. S throughout with no LOB.  Therapeutic Activity 2: picking up objects off of the floor 1x10 no LOB CGA throughout  Therapeutic Activity 3: discussing/educating patient on purchasing tub/transfer bench    Balance/Neuromuscular Re-Education  Balance/Neuromuscular Re-Education Activity Performed: Yes  Balance/Neuromuscular Re-Education Activity 1: standing on foam pad with no UE support completing dynamic reaching activity at various surfaces. No LOB, CGA throughout.  Balance/Neuromuscular Re-Education Activity 2: standing on foam pad completing vertical ball toss for 3 2 minute increments, no LOB CGA throughout    Outcome Measures:  [facility] Daily Activity  Putting on and taking off regular lower body clothing: A little  Bathing (including washing, rinsing, drying): A little  Putting on and taking off regular upper body clothing: None  Toileting, which includes using toilet, bedpan or urinal: A little  Taking care of personal grooming such as brushing teeth: None  Eating Meals: None  Daily Activity - Total Score: 21    Goals:  Encounter Problems       Encounter Problems (Active)       ADLs       Patient will perform UB and LB bathing with set-up and supervision level of assistance . (Progressing)       Start:   09/21/24    Expected End:  10/12/24            Patient with complete upper body dressing with modified independent level of assistance donning and doffing all UE clothes with PRN adaptive equipment following precautions (Met)       Start:  09/21/24    Expected End:  10/12/24    Resolved:  09/25/24         Patient with complete lower body dressing with modified independent level of assistance donning and doffing all LE clothes  with PRN adaptive equipment (Progressing)       Start:  09/21/24    Expected End:  10/12/24            Patient will complete daily grooming tasks brushing teeth, shaving, and washing face/hair with modified independent level of assistance  (Met)       Start:  09/21/24    Expected End:  10/12/24    Resolved:  09/24/24         Patient will complete toileting including hygiene clothing management/hygiene with modified independent level of assistance.       Start:  09/21/24    Expected End:  10/12/24               TRANSFERS       Patient will perform bed mobility modified independent level of assistance in order to improve safety and independence with mobility       Start:  09/21/24    Expected End:  10/12/24            Patient will complete functional transfer to chair, bed, toilet, shower, car with least restrictive device with modified independent level of assistance. (Progressing)       Start:  09/21/24    Expected End:  10/12/24

## 2024-09-26 NOTE — CARE PLAN
The patient's goals for the shift include      The clinical goals for the shift include Pt will have pain less than 5/10 during todyas shift    Patient reported 8/10 pain today after working with PT in the afternoon. Patient had a shower in morning with OT. Continued to work with OT and PT throughout shift. Patient has been pleasant throughout shift. Visited by wife. Patient used call bell when needing top use the restroom. Patient sitting in chair with call bell within reach.

## 2024-09-26 NOTE — CARE PLAN
The patient's goals for the shift include      The clinical goals for the shift include Pt will tolerate activity throughout shift    Over the shift, the patient had an uneventful shift  VSS  Declines need for pain management this shift  Slept well  Ambulating to bathroom with cane and SBA  Tolerates activity well  Call light in place  Safety measures maintained

## 2024-09-26 NOTE — PROGRESS NOTES
Physical Therapy    Physical Therapy Treatment    Patient Name: Edward Andrade  MRN: [id number]  Department: [facility]  Room: [address] [place] [address]  Today's Date: 9/26/2024  Time Calculation  Start Time: 0835  Stop Time: 0904  Time Calculation (min): 29 min         Assessment/Plan   PT Assessment  End of Session Communication: Bedside nurse  Assessment Comment: Improving with straight cane noting only 2 slight balance losses both easily self corrected.  End of Session Patient Position: Up in chair, Alarm on     PT Plan  Treatment/Interventions: Bed mobility, Gait training, Transfer training, Stair training, Balance training, Neuromuscular re-education, Endurance training, Strengthening, Range of motion, Therapeutic exercise, Therapeutic activity, Home exercise program  PT Plan: Ongoing PT  PT Frequency: BID  PT Discharge Recommendations: Low intensity level of continued care  Equipment Recommended upon Discharge:  (TBD but likely cane)  PT Recommended Transfer Status: Assist x1, Assistive device  PT - OK to Discharge: Yes      General Visit Information:   PT  Visit  PT Received On: 09/26/24  General  Prior to Session Communication: Bedside nurse  Patient Position Received: Up in chair, Alarm on  General Comment: Feels good today, finishing breakfast and anticipates shower this AM.    Subjective   Precautions:  Precautions  Medical Precautions: Fall precautions    Vital Signs (Past 2hrs)        Date/Time Vitals Session Patient Position Pulse Resp SpO2 BP MAP (mmHg)    09/26/24 0724 --  --  72  18  93 %  119/69  --    09/26/24 0835 --  --  89  --  97 %  --  --    09/26/24 0901 --  --  --  --  99 %  --  --                        Objective   Pain:  Pain Assessment  Pain Assessment: 0-10  0-10 (Numeric) Pain Score: 0 - No pain       Postural Control:  Dynamic Standing Balance  Dynamic Standing-Level of Assistance: Close supervision    Activity Tolerance:  Activity Tolerance  Endurance: Tolerates 10 - 20 min exercise with multiple  rests  Activity Tolerance Comments: No significant fatigue today.  Treatments:  Therapeutic Exercise  Therapeutic Exercise Performed: Yes  Therapeutic Exercise Activity 1: Sit to stands 2x10  Therapeutic Exercise Activity 2: hip marches 3x10  Therapeutic Exercise Activity 3: LAQ 3x10  Therapeutic Exercise Activity 4: Sit to stand from bedside with LUE assist 3x5    Bed Mobility  Bed Mobility: Yes  Bed Mobility 1  Bed Mobility 1: Supine to sitting  Level of Assistance 1: Modified independent  Bed Mobility Comments 1: Using L bed rail to assist    Ambulation/Gait Training  Ambulation/Gait Training Performed: Yes  Ambulation/Gait Training 1  Surface 1: Level tile  Device 1: Single point cane  Gait Support Devices: Gait belt  Assistance 1: Distant supervision  Quality of Gait 1:  (Improving gait speed today without balance losses.)  Comments/Distance (ft) 1: 2x250 ft  Transfers  Transfer: Yes  Transfer 1  Transfer From 1: Chair with arms to  Transfer to 1: Stand  Technique 1: Sit to stand  Transfer Device 1: Cane  Transfer Level of Assistance 1: Modified independent  Trials/Comments 1: Reminders to control descent eccentrically to improve LE strength and safety.    Stairs  Stairs: Yes  Stairs  Rails 1: Right  Device 1: Single point cane  Assistance 1: Modified independent  Comment/Number of Steps 1: 4    Outcome Measures:  [facility] Basic Mobility  Turning from your back to your side while in a flat bed without using bedrails: None  Moving from lying on your back to sitting on the side of a flat bed without using bedrails: None  Moving to and from bed to chair (including a wheelchair): A little  Standing up from a chair using your arms (e.g. wheelchair or bedside chair): None  To walk in hospital room: A little  Climbing 3-5 steps with railing: None  Basic Mobility - Total Score: 22    Education Documentation  No documentation found.  Education Comments  No comments found.        OP EDUCATION:       Encounter Problems        Encounter Problems (Active)       PT Problem       pt will be able to ambulate >100 ft with FWW vs cane and modified independent  (Progressing)       Start:  09/21/24    Expected End:  10/05/24            Pt will be able to perform STS transfer with modified independent  (Progressing)       Start:  09/21/24    Expected End:  10/05/24            Pt will be able to perform all aspects of bed mobility with modified independent  (Progressing)       Start:  09/21/24    Expected End:  10/05/24            Pt will be able to navigate 3 steps with no hand rail and modified independent to allow for safe DC.   (Progressing)       Start:  09/21/24    Expected End:  10/05/24            Pt will demonstrate ability to  object from ground from standing position, with LRAD and supervision (Progressing)       Start:  09/21/24    Expected End:  10/05/24               Pain - Adult

## 2024-09-26 NOTE — PROGRESS NOTES
Physical Therapy    Physical Therapy Treatment    Patient Name: Edward Andrade  MRN: [id number]  Department: [facility]  Room: [address] [place] [address]  Today's Date: 9/26/2024  Time Calculation  Start Time: 1518  Stop Time: 1550  Time Calculation (min): 32 min         Assessment/Plan   PT Assessment  End of Session Communication: Bedside nurse, PCT/NA/CTA  Assessment Comment: Pt tolerated session well. Pt challenged with higher level balance exercises. Shoes donned improving performance.  End of Session Patient Position: Up in chair, Alarm on     PT Plan  Treatment/Interventions: Bed mobility, Gait training, Transfer training, Stair training, Balance training, Neuromuscular re-education, Endurance training, Strengthening, Range of motion, Therapeutic exercise, Therapeutic activity, Home exercise program  PT Plan: Ongoing PT  PT Frequency: BID  PT Discharge Recommendations: Low intensity level of continued care  Equipment Recommended upon Discharge:  (TBD but likely cane)  PT Recommended Transfer Status: Assist x1, Assistive device  PT - OK to Discharge: Yes      General Visit Information:   PT  Visit  PT Received On: 09/26/24  Response to Previous Treatment: Patient with no complaints from previous session.  General  Prior to Session Communication: Bedside nurse  Patient Position Received: Up in chair, Alarm on  General Comment: Pt pleasant and agreeable to participate. Pt cleared by nursing staff. Left with call bell within reach and needs addressed.    Subjective   Precautions:  Precautions  Hearing/Visual Limitations: glaucoma, worse in R eye  Medical Precautions: Fall precautions  Precautions Comment: R 4th and 5th rib fx, R clavicle fx, low gait belt, wound on R upper back      Objective   Pain:  Pain Assessment  Pain Assessment: 0-10  0-10 (Numeric) Pain Score: 0 - No pain    Treatments:  Balance/Neuromuscular Re-Education  Balance/Neuromuscular Re-Education Activity Performed:  (shoes on through out all which appears to  improve stability)  Balance/Neuromuscular Re-Education Activity 1: 4 square stepping x5 ea direction, CGA, minor unsteadiness but no LOB  Balance/Neuromuscular Re-Education Activity 2: x3 narrow turns around object, focus on improving speed w narrow turns, CGA, no LOB  Balance/Neuromuscular Re-Education Activity 3: 20ft x2 heel and toe walking, increased difficulty w heel, focus on dyamic balance  Balance/Neuromuscular Re-Education Activity 4: 20ft x2 amb w vertical and horizontal head turns, focus on dyamic balance  Balance/Neuromuscular Re-Education Activity 5: 20ft x2 amb w marching/high knees, focus on dyamic balance  Balance/Neuromuscular Re-Education Activity 6: 20ft x2 tandem walking, focus on dyamic balance  Balance/Neuromuscular Re-Education Activity 7: 20ft x2 amb fwd but alt between having wide and narrow SAILAJA, similar to ladder drill    Ambulation/Gait Training 1  Surface 1: Level tile  Device 1: Single point cane  Gait Support Devices: Gait belt  Assistance 1: Distant supervision  Quality of Gait 1: Diminished heel strike  Comments/Distance (ft) 1: 1x300 ft. shoes donned appearing to make amb more steady for pt  Transfer 1  Transfer From 1: Chair with arms to  Transfer to 1: Stand, Sit  Technique 1: Sit to stand, Stand to sit  Transfer Device 1: Cane  Transfer Level of Assistance 1: Modified independent  Trials/Comments 1: mult trials during session, no concerns    Stairs  Rails 1: Right  Device 1: Single point cane  Support Devices 1: Gait belt  Assistance 1: Distant supervision  Comment/Number of Steps 1: 2x2 steps, good sequencing, use of RUE to steady self as needed on rail. no LOB this date. shoes donned    Outcome Measures:  [facility] Basic Mobility  Turning from your back to your side while in a flat bed without using bedrails: None  Moving from lying on your back to sitting on the side of a flat bed without using bedrails: None  Moving to and from bed to chair (including a wheelchair): A  little  Standing up from a chair using your arms (e.g. wheelchair or bedside chair): None  To walk in hospital room: A little  Climbing 3-5 steps with railing: A little  Basic Mobility - Total Score: 21    Education Documentation  No documentation found.  Education Comments  No comments found.      Encounter Problems       Encounter Problems (Active)       PT Problem       pt will be able to ambulate >100 ft with FWW vs cane and modified independent  (Progressing)       Start:  09/21/24    Expected End:  10/05/24            Pt will be able to perform STS transfer with modified independent  (Met)       Start:  09/21/24    Expected End:  10/05/24    Resolved:  09/26/24         Pt will be able to perform all aspects of bed mobility with modified independent  (Progressing)       Start:  09/21/24    Expected End:  10/05/24            Pt will be able to navigate 3 steps with no hand rail and modified independent to allow for safe DC.   (Progressing)       Start:  09/21/24    Expected End:  10/05/24            Pt will demonstrate ability to  object from ground from standing position, with LRAD and supervision (Met)       Start:  09/21/24    Expected End:  10/05/24    Resolved:  09/26/24            Pain - Adult

## 2024-09-27 ENCOUNTER — HOME HEALTH ADMISSION (OUTPATIENT)
Dept: HOME HEALTH SERVICES | Facility: HOME HEALTH | Age: 69
End: 2024-09-27
Payer: MEDICARE

## 2024-09-27 ENCOUNTER — DOCUMENTATION (OUTPATIENT)
Dept: HOME HEALTH SERVICES | Facility: HOME HEALTH | Age: 69
End: 2024-09-27
Payer: MEDICARE

## 2024-09-27 VITALS
HEART RATE: 89 BPM | SYSTOLIC BLOOD PRESSURE: 126 MMHG | TEMPERATURE: 97.4 F | WEIGHT: 197.75 LBS | HEIGHT: 72 IN | RESPIRATION RATE: 16 BRPM | BODY MASS INDEX: 26.78 KG/M2 | DIASTOLIC BLOOD PRESSURE: 74 MMHG | OXYGEN SATURATION: 98 %

## 2024-09-27 PROCEDURE — 97110 THERAPEUTIC EXERCISES: CPT | Mod: GP

## 2024-09-27 PROCEDURE — 2500000005 HC RX 250 GENERAL PHARMACY W/O HCPCS: Performed by: STUDENT IN AN ORGANIZED HEALTH CARE EDUCATION/TRAINING PROGRAM

## 2024-09-27 PROCEDURE — 97535 SELF CARE MNGMENT TRAINING: CPT | Mod: GO

## 2024-09-27 PROCEDURE — 97112 NEUROMUSCULAR REEDUCATION: CPT | Mod: GP

## 2024-09-27 PROCEDURE — 97116 GAIT TRAINING THERAPY: CPT | Mod: GP

## 2024-09-27 PROCEDURE — 2500000005 HC RX 250 GENERAL PHARMACY W/O HCPCS: Performed by: NURSE PRACTITIONER

## 2024-09-27 PROCEDURE — 97530 THERAPEUTIC ACTIVITIES: CPT | Mod: GP

## 2024-09-27 PROCEDURE — 2500000001 HC RX 250 WO HCPCS SELF ADMINISTERED DRUGS (ALT 637 FOR MEDICARE OP): Performed by: NURSE PRACTITIONER

## 2024-09-27 PROCEDURE — 99315 NF DSCHRG MGMT 30 MIN/LESS: CPT | Performed by: STUDENT IN AN ORGANIZED HEALTH CARE EDUCATION/TRAINING PROGRAM

## 2024-09-27 PROCEDURE — 2500000002 HC RX 250 W HCPCS SELF ADMINISTERED DRUGS (ALT 637 FOR MEDICARE OP, ALT 636 FOR OP/ED): Performed by: NURSE PRACTITIONER

## 2024-09-27 PROCEDURE — 94760 N-INVAS EAR/PLS OXIMETRY 1: CPT

## 2024-09-27 RX ORDER — DORZOLAMIDE HCL 20 MG/ML
1 SOLUTION/ DROPS OPHTHALMIC 2 TIMES DAILY
Qty: 10 ML | Refills: 2 | Status: SHIPPED | OUTPATIENT
Start: 2024-09-27 | End: 2024-10-27

## 2024-09-27 RX ORDER — LATANOPROSTENE BUNOD 0.24 MG/ML
1 SOLUTION/ DROPS OPHTHALMIC NIGHTLY
Qty: 5 ML | Refills: 2 | Status: SHIPPED | OUTPATIENT
Start: 2024-09-27

## 2024-09-27 RX ORDER — METOPROLOL TARTRATE 100 MG/1
100 TABLET ORAL 2 TIMES DAILY
Qty: 60 TABLET | Refills: 2 | Status: SHIPPED | OUTPATIENT
Start: 2024-09-27 | End: 2024-12-26

## 2024-09-27 RX ORDER — AMIODARONE HYDROCHLORIDE 200 MG/1
200 TABLET ORAL DAILY
Qty: 30 TABLET | Refills: 2 | Status: SHIPPED | OUTPATIENT
Start: 2024-09-27 | End: 2024-12-26

## 2024-09-27 RX ORDER — BRIMONIDINE TARTRATE 2 MG/ML
1 SOLUTION/ DROPS OPHTHALMIC 2 TIMES DAILY
Qty: 15 ML | Refills: 2 | Status: SHIPPED | OUTPATIENT
Start: 2024-09-27 | End: 2024-10-27

## 2024-09-27 ASSESSMENT — COGNITIVE AND FUNCTIONAL STATUS - GENERAL
DRESSING REGULAR UPPER BODY CLOTHING: A LITTLE
CLIMB 3 TO 5 STEPS WITH RAILING: A LITTLE
DAILY ACTIVITIY SCORE: 19
STANDING UP FROM CHAIR USING ARMS: A LITTLE
HELP NEEDED FOR BATHING: A LITTLE
MOVING FROM LYING ON BACK TO SITTING ON SIDE OF FLAT BED WITH BEDRAILS: A LITTLE
PERSONAL GROOMING: A LITTLE
TOILETING: A LITTLE
DAILY ACTIVITIY SCORE: 24
WALKING IN HOSPITAL ROOM: A LITTLE
MOVING TO AND FROM BED TO CHAIR: A LITTLE
TURNING FROM BACK TO SIDE WHILE IN FLAT BAD: A LITTLE
MOBILITY SCORE: 17
WALKING IN HOSPITAL ROOM: A LITTLE
MOBILITY SCORE: 21
MOVING TO AND FROM BED TO CHAIR: A LITTLE
DRESSING REGULAR LOWER BODY CLOTHING: A LITTLE
MOBILITY SCORE: 24
CLIMB 3 TO 5 STEPS WITH RAILING: A LOT

## 2024-09-27 ASSESSMENT — PAIN SCALES - GENERAL
PAINLEVEL_OUTOF10: 0 - NO PAIN
PAINLEVEL_OUTOF10: 0 - NO PAIN

## 2024-09-27 ASSESSMENT — PAIN - FUNCTIONAL ASSESSMENT
PAIN_FUNCTIONAL_ASSESSMENT: 0-10

## 2024-09-27 ASSESSMENT — ACTIVITIES OF DAILY LIVING (ADL): HOME_MANAGEMENT_TIME_ENTRY: 23

## 2024-09-27 NOTE — PROGRESS NOTES
Physical Therapy    Physical Therapy Treatment    Patient Name: Edward Andrade  MRN: [id number]  Department: [facility]  Room: [address] [place] [address]  Today's Date: 9/27/2024  Time Calculation  Start Time: 0737  Stop Time: 0807  Time Calculation (min): 30 min         Assessment/Plan   PT Assessment  End of Session Communication: Bedside nurse, PCT/NA/CTA  Assessment Comment: Pt tolerated session well. Pt challenged with higher level balance exercises today. no LOBs noted and pt progressing well towards goals.  End of Session Patient Position: Up in chair, Alarm on     PT Plan  Treatment/Interventions: Bed mobility, Gait training, Transfer training, Stair training, Balance training, Neuromuscular re-education, Endurance training, Strengthening, Range of motion, Therapeutic exercise, Therapeutic activity, Home exercise program  PT Plan: Ongoing PT  PT Frequency: BID  PT Discharge Recommendations: Low intensity level of continued care  Equipment Recommended upon Discharge:  (TBD but likely cane)  PT Recommended Transfer Status: Assist x1, Assistive device  PT - OK to Discharge: Yes      General Visit Information:   PT  Visit  PT Received On: 09/27/24  Response to Previous Treatment: Patient with no complaints from previous session.  General  Prior to Session Communication: Bedside nurse  Patient Position Received: Bed, 2 rail up, Alarm off, not on at start of session  General Comment: Pt pleasant and agreeable to participate. Pt cleared by nursing staff. Left with call bell within reach and needs addressed.    Subjective   Precautions:  Precautions  Hearing/Visual Limitations: glaucoma, worse in R eye  Medical Precautions: Fall precautions  Precautions Comment: R 4th and 5th rib fx, R clavicle fx, low gait belt, wound on R upper back      Objective   Pain:  Pain Assessment  Pain Assessment: 0-10  0-10 (Numeric) Pain Score: 0 - No pain (reporting general stiffness but no pain)    Treatments:  Balance/Neuromuscular  Re-Education  Balance/Neuromuscular Re-Education Activity Performed:  (shoes donned for all exercises)  Balance/Neuromuscular Re-Education Activity 1: 20 ft x2 lateral amb ea direction, no Ue support this date, w supervision  Balance/Neuromuscular Re-Education Activity 2: 20 ft x3 retro amb, no Ue support this date, w CGA and vcs for step length  Balance/Neuromuscular Re-Education Activity 3: 20ft x2 amb fwd and bwd but alt between having wide and narrow SAILAJA, similar to ladder drill  Balance/Neuromuscular Re-Education Activity 4: lateral amb w fwd and bwd steps using tiles on floor. 20ft x2 ea direction.  Balance/Neuromuscular Re-Education Activity 5: 20 ft x 2 amb while skipping large tile on floor  Balance/Neuromuscular Re-Education Activity 6: 20ft x2 tandem walking, focus on dyamic balance  Balance/Neuromuscular Re-Education Activity 7: 20ft x2 amb fwd but alt between having wide and narrow SAILAJA, similar to ladder drill    Bed Mobility 1  Bed Mobility 1: Supine to sitting  Level of Assistance 1: Modified independent  Bed Mobility Comments 1: mod I exiting on L side of bed w increased momentum. Increased difficulty noted on R side d/t fractures    Ambulation/Gait Training 1  Surface 1: Level tile  Device 1: Single point cane  Gait Support Devices: Gait belt  Assistance 1: Modified independent, Distant supervision  Quality of Gait 1: Diminished heel strike  Comments/Distance (ft) 1: 1x250 ft and other short distances. mod I for short distances, such as in the room, distant supervision for longer distances.  Transfer 1  Transfer From 1: Chair with arms to  Transfer to 1: Stand, Sit  Technique 1: Sit to stand, Stand to sit  Transfer Device 1: Cane  Transfer Level of Assistance 1: Modified independent  Trials/Comments 1: mult trials    Stairs  Rails 1: Right  Device 1: Single point cane  Support Devices 1: Gait belt  Assistance 1: Distant supervision  Comment/Number of Steps 1: 2x2 6'' steps. no LOB noted, good  sequencing.     Object From Floor  Devices: Single point cane  Assist Level: Modified independent  Comments: pt picking 1 object off ground, no LOB and steady throughout    Outcome Measures:  [facility] Basic Mobility  Turning from your back to your side while in a flat bed without using bedrails: None  Moving from lying on your back to sitting on the side of a flat bed without using bedrails: None  Moving to and from bed to chair (including a wheelchair): A little  Standing up from a chair using your arms (e.g. wheelchair or bedside chair): None  To walk in hospital room: A little  Climbing 3-5 steps with railing: A little  Basic Mobility - Total Score: 21    Education Documentation  No documentation found.  Education Comments  No comments found.        Encounter Problems       Encounter Problems (Active)       PT Problem       pt will be able to ambulate >100 ft with FWW vs cane and modified independent  (Progressing)       Start:  09/21/24    Expected End:  10/05/24            Pt will be able to perform STS transfer with modified independent  (Met)       Start:  09/21/24    Expected End:  10/05/24    Resolved:  09/26/24         Pt will be able to perform all aspects of bed mobility with modified independent  (Met)       Start:  09/21/24    Expected End:  10/05/24    Resolved:  09/27/24         Pt will be able to navigate 3 steps with no hand rail and modified independent to allow for safe DC.   (Progressing)       Start:  09/21/24    Expected End:  10/05/24            Pt will demonstrate ability to  object from ground from standing position, with LRAD and supervision (Met)       Start:  09/21/24    Expected End:  10/05/24    Resolved:  09/26/24            Pain - Adult

## 2024-09-27 NOTE — PROGRESS NOTES
Physical Therapy    Physical Therapy Treatment    Patient Name: Edward Andrade  MRN: [id number]  Department: [facility]  Room: [address] [place] [address]  Today's Date: 9/27/2024  Time Calculation  Start Time: 1335  Stop Time: 1415  Time Calculation (min): 40 min    Assessment/Plan    PT Plan  Treatment/Interventions: Bed mobility, Gait training, Transfer training, Stair training, Balance training, Neuromuscular re-education, Endurance training, Strengthening, Range of motion, Therapeutic exercise, Therapeutic activity, Home exercise program  PT Plan: Ongoing PT  PT Frequency: BID  PT Discharge Recommendations: Low intensity level of continued care  Equipment Recommended upon Discharge:  (TBD but likely cane)  PT Recommended Transfer Status: Assist x1, Assistive device  PT - OK to Discharge: Yes    General Visit Information:   PT  Visit  PT Received On: 09/27/24  General  Prior to Session Communication: Bedside nurse  Patient Position Received: Up in chair, Alarm on  General Comment: Doing very well today. Patient is to be discharged this afternoon.    Subjective   Precautions:  Fall precautions     Vital Signs (Past 2hrs)        Date/Time Vitals Session Patient Position Pulse Resp SpO2 BP MAP (mmHg)    09/27/24 1335 --  --  89  --  98 %  --  --                  Objective   Pain:  Pain Assessment  Pain Assessment: 0-10  0-10 (Numeric) Pain Score: 0 - No pain    Activity Tolerance:  Activity Tolerance  Endurance: Tolerates 30+ min exercise without fatigue  Activity Tolerance Comments: Improving nicely. Ready for discharge.  Treatments:  Therapeutic Exercise  Therapeutic Exercise Performed: Yes  Therapeutic Exercise Activity 1: Sit to stands 2x10  Therapeutic Exercise Activity 2: Seated hip flexion 3x10  Therapeutic Exercise Activity 3: Seated LAQ 3x10  Therapeutic Exercise Activity 4: Seated calf raise 3x10    Ambulation/Gait Training  Ambulation/Gait Training Performed: Yes  Ambulation/Gait Training 1  Surface 1: Level tile  Device 1:  Single point cane  Gait Support Devices: Gait belt  Assistance 1: Modified independent  Quality of Gait 1: Diminished heel strike  Comments/Distance (ft) 1: 3x300 ft  Transfers  Transfer: Yes  Transfer 1  Transfer From 1: Chair with arms to  Transfer to 1: Stand  Technique 1: Sit to stand  Transfer Device 1: Cane  Transfer Level of Assistance 1: Modified independent    Stairs  Stairs: Yes  Stairs  Rails 1: Right  Device 1: Single point cane  Support Devices 1: Gait belt  Assistance 1: Modified independent  Comment/Number of Steps 1: 3x4 steps (no RUE WB).    Outcome Measures:  [facility] Basic Mobility  Turning from your back to your side while in a flat bed without using bedrails: None  Moving from lying on your back to sitting on the side of a flat bed without using bedrails: None  Moving to and from bed to chair (including a wheelchair): None  Standing up from a chair using your arms (e.g. wheelchair or bedside chair): None  To walk in hospital room: None  Climbing 3-5 steps with railing: None  Basic Mobility - Total Score: 24    Education Documentation  No documentation found.  Education Comments  No comments found.        OP EDUCATION:       Encounter Problems       Encounter Problems (Active)       PT Problem       pt will be able to ambulate >100 ft with FWW vs cane and modified independent  (Progressing)       Start:  09/21/24    Expected End:  10/05/24            Pt will be able to perform STS transfer with modified independent  (Met)       Start:  09/21/24    Expected End:  10/05/24    Resolved:  09/26/24         Pt will be able to perform all aspects of bed mobility with modified independent  (Met)       Start:  09/21/24    Expected End:  10/05/24    Resolved:  09/27/24         Pt will be able to navigate 3 steps with no hand rail and modified independent to allow for safe DC.   (Progressing)       Start:  09/21/24    Expected End:  10/05/24            Pt will demonstrate ability to  object from ground  from standing position, with LRAD and supervision (Met)       Start:  09/21/24    Expected End:  10/05/24    Resolved:  09/26/24            Pain - Adult

## 2024-09-27 NOTE — PROGRESS NOTES
Occupational Therapy    Occupational Therapy Treatment    Name: Edward Andrade  MRN: [id number]  Department: [facility]  Room: [facility]-A  Date: 09/27/24  Time Calculation  Start Time: 1128  Stop Time: 1151  Time Calculation (min): 23 min    Assessment:  OT Assessment: Reviewed all necessary goals this date and determined to be met. Educted about safety going home with AD to good affect. Patient has met all OT goals this date. No further IP OT required. Recommend HH upon return home to continue to build endurance with ADLs.  Evaluation/Treatment Tolerance: Patient tolerated treatment well  End of Session Communication: Bedside nurse, PCT/NA/CTA, Care Coordinator  End of Session Patient Position: Up in chair, Alarm on  Plan:  Treatment Interventions: ADL retraining, Functional transfer training, UE strengthening/ROM, Endurance training, Patient/family training, Neuromuscular reeducation, Compensatory technique education  OT Frequency: Other (Comment) (no further IP OT needed)  OT Discharge Recommendations: Low intensity level of continued care  OT Recommended Transfer Status: Assist of 1  OT - OK to Discharge: Yes    Subjective   Previous Visit Info:  OT Last Visit On: 09/26/24  OT Received On: 09/27/24    General:  General  Prior to Session Communication: Bedside nurse, PCT/NA/CTA  Patient Position Received: Up in chair, Alarm on  General Comment: Pt pleseant and cooperative throughout session. Looking forward to returning home this date.    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  UE Weight Bearing Status: Other (Comment) (limited WB through R UE d/t clavicle fx)  Medical Precautions: Fall precautions  Precautions Comment: R 4th and 5th rib fx, R clavicle fx, low gait belt, wound on R upper back    Pain Assessment:  Pain Assessment  Pain Assessment: 0-10  0-10 (Numeric) Pain Score:  (reporting intermitant soreness in L shoulder though improved and not affecting participation in therapy.)      Objective   Cognition:  Overall Cognitive Status: Within Functional Limits    Activities of Daily Living:   UE Dressing  UE Dressing Level of Assistance: Modified independent    LE Dressing  LE Dressing: Yes  LE Dressing Adaptive Equipment: Sock aide, Elastic laces  Pants Level of Assistance: Modified independent  Sock Level of Assistance: Modified independent  Shoe Level of Assistance: Modified independent  Adult Briefs Level of Assistance: Modified independent  LE Dressing Where Assessed: Chair    Toileting  Toileting Level of Assistance: Modified independent  Where Assessed: Toilet  Toileting Comments: good safety throughout; no use of grab bars     Bed Mobility/Transfers:   Bed Mobility  Bed Mobility: Yes  Bed Mobility 1  Bed Mobility 1: Supine to sitting, Sitting to supine  Level of Assistance 1: Modified independent  Bed Mobility Comments 1: no use of bed rails from flat surface, no issues; practicing on L side as the patient will complete at home    Transfers  Transfer: Yes  Transfer 1  Transfer From 1: Chair with arms to  Transfer to 1: Stand, Sit  Technique 1: Sit to stand, Stand to sit  Transfer Device 1: Cane  Transfer Level of Assistance 1: Modified independent  Trials/Comments 1: multiple attempts throughout session    Toilet Transfers  Toilet Transfer From: Cane  Toilet Transfer Type: To and from  Toilet Transfer to: Raised toilet seat without rails  Toilet Transfer Technique: Ambulating  Toilet Transfers: Modified independence  Tub Transfers  Tub Transfer From: Cane  Tub Transfer Type: To and from  Tub Transfer to: Transfer tub bench  Tub Transfer Technique: Ambulating  Tub Transfers: Modified independence  Tub Transfers Comments: no issues; te discussing bathroom set up and patient confirming dtr purchased all suggested equipment    Functional Mobility:  Functional Mobility  Functional Mobility Performed: Yes  Functional Mobility 1  Surface 1: Level tile  Device 1: Single point  cane  Functional Mobility Support Devices: Gait belt  Assistance 1: Distant supervision     Therapy/Activity:   Therapeutic Activity  Therapeutic Activity Performed: Yes  Therapeutic Activity 1: Patient expressing concerns about exertion, reoriented him to percieved exertion scale with good affect.  Therapeutic Activity 2: picking up objects off of the floor 1x10 no LOB CGA throughout  Therapeutic Activity 3: discussing/educating patient on purchasing tub/transfer bench    Outcome Measures:  [facility] Daily Activity  Putting on and taking off regular lower body clothing: None  Bathing (including washing, rinsing, drying): None  Putting on and taking off regular upper body clothing: None  Toileting, which includes using toilet, bedpan or urinal: None  Taking care of personal grooming such as brushing teeth: None  Eating Meals: None  Daily Activity - Total Score: 24    Goals:  Encounter Problems       Encounter Problems (Resolved)       ADLs       Patient will perform UB and LB bathing with set-up and supervision level of assistance . (Met)       Start:  09/21/24    Expected End:  10/12/24    Resolved:  09/27/24         Patient with complete upper body dressing with modified independent level of assistance donning and doffing all UE clothes with PRN adaptive equipment following precautions (Met)       Start:  09/21/24    Expected End:  10/12/24    Resolved:  09/25/24         Patient with complete lower body dressing with modified independent level of assistance donning and doffing all LE clothes  with PRN adaptive equipment (Met)       Start:  09/21/24    Expected End:  10/12/24    Resolved:  09/27/24         Patient will complete daily grooming tasks brushing teeth, shaving, and washing face/hair with modified independent level of assistance  (Met)       Start:  09/21/24    Expected End:  10/12/24    Resolved:  09/24/24         Patient will complete toileting including hygiene clothing management/hygiene with modified  independent level of assistance. (Met)       Start:  09/21/24    Expected End:  10/12/24    Resolved:  09/27/24            TRANSFERS       Patient will perform bed mobility modified independent level of assistance in order to improve safety and independence with mobility (Met)       Start:  09/21/24    Expected End:  10/12/24    Resolved:  09/27/24         Patient will complete functional transfer to chair, bed, toilet, shower, car with least restrictive device with modified independent level of assistance. (Met)       Start:  09/21/24    Expected End:  10/12/24    Resolved:  09/27/24

## 2024-09-27 NOTE — HH CARE COORDINATION
Home Care received a Referral for Nursing and Physical Therapy. We have processed the referral for a Start of Care on 9.29 or 9.30.     If you have any questions or concerns, please feel free to contact us at [phone]. Follow the prompts, enter your five digit zip code, and you will be directed to your care team on [facility].

## 2024-09-27 NOTE — CARE PLAN
Patient discharged home with [facility] health. Patient verbalized understanding of discharge instructions and all follow up appointments.

## 2024-09-28 NOTE — DISCHARGE SUMMARY
Discharge Diagnosis  Bilateral pulmonary embolism (Multi)    Issues Requiring Follow-Up  PT via [facility]    Test Results Pending At Discharge  Pending Labs       No current pending labs.            Hospital Course  Edward Andrade  is a 69-year-old male with past medical history of hypertension, HLD who presented as a transfer from [place] ED due to a syncopal episode and found to have saddle PE with right heart strain and HD unstability s/p tPA, who was started on vasopressors before being brought in to [facility] MICU, course was complicated with right hemithorax, right clavicle and 4th/5th rib fractures. Patient coded on morning of 9/3, one day after admission during intubation for acute hypoxic respiratory failure, during the event had hemoptysis/hematemesis with active bleeding requiring massive transfusion protocol after drop of Hb by 4 mg in 20 minutes and TXA, achieved ROSC after 7 minutes. NELSY with creatinine up to 1.48 now resolved, likely in the setting of cardiac arrest/shock or contrast nephropathy. Patient also developed Afib with RVR during hospitalization requiring amio boluses and loading, currently on 200mgPO and metoprolol 100mg BID. CT chest with right hemithorax and left pneumothorax. Mini-thoracotomy/ bronchoscopy and BAL on 9/9 with washout and 2 chest tubes placed on the right with pigtail chest tube on left for pneumothorax, now has new VAP left base, getting treated for that. One right chest tube remaining with plan for removal 9/16/2024     He was admitted to [facility] unit for PT/OT. Patient did well and requested to be discharged home early.    #Atrial Fibrillation: continue amiodarone   #Left Pneumothorax s/p pigtail removed on 9/12  #Right clavicular fracture   #right posterolateral fourth and fifth rib fractures     Plan:   - Oxycodone 5-10mg q6hr PRN  - Contninue sling for comfort   -  Ortho follow up in 4 weeks with Dr. Kamron Butcher with new XR at that time   - [facility] PT. He was  provided cane and supplies needed.    Less than 30 minutes were spent in coordinating patient discharge.    Pertinent Physical Exam At Time of Discharge  Physical Exam  Vitals and nursing note reviewed.   Constitutional:       Appearance: Normal appearance.   HENT:      Head: Normocephalic and atraumatic.      Mouth/Throat:      Mouth: Mucous membranes are moist.   Eyes:      Extraocular Movements: Extraocular movements intact.      Conjunctiva/sclera: Conjunctivae normal.   Cardiovascular:      Rate and Rhythm: Normal rate. Rhythm irregular.      Pulses: Normal pulses.      Heart sounds: Normal heart sounds.   Pulmonary:      Effort: Pulmonary effort is normal.      Breath sounds: Normal breath sounds.   Abdominal:      General: Abdomen is flat. Bowel sounds are normal.      Palpations: Abdomen is soft.   Skin:     General: Skin is warm.   Neurological:      Mental Status: He is alert and oriented to person, place, and time.      Motor: Weakness present.      Gait: Gait abnormal.   Psychiatric:         Mood and Affect: Mood normal.         Behavior: Behavior normal.         Thought Content: Thought content normal.         Judgment: Judgment normal.          Home Medications     Medication List      CONTINUE taking these medications     amiodarone 200 mg tablet; Commonly known as: Pacerone; Take 1 tablet   (200 mg) by mouth once daily.   apixaban 5 mg tablet; Commonly known as: Eliquis; Take 1 tablet (5 mg)   by mouth 2 times a day.   brimonidine 0.2 % ophthalmic solution; Commonly known as: AlphaGAN;   Administer 1 drop into both eyes 2 times a day.   dorzolamide 2 % ophthalmic solution; Commonly known as: Trusopt;   Administer 1 drop into both eyes 2 times a day.   metoprolol tartrate 100 mg tablet; Commonly known as: Lopressor; Take 1   tablet (100 mg) by mouth 2 times a day.   oxyCODONE 5 mg immediate release tablet; Commonly known as: Roxicodone   Vyzulta 0.024 % drops; Generic drug: latanoprostene bunod;  Administer 1   drop into both eyes once daily at bedtime.       Outpatient Follow-Up  Future Appointments   Date Time Provider Department Center   10/9/2024 10:30 AM Romulo Meléndez MD [id number] [facility]   3/11/2025  1:00 PM Nicole Coronado MD [id number] [facility]       Fidel Ventura DO

## 2024-09-30 ENCOUNTER — PATIENT OUTREACH (OUTPATIENT)
Dept: PRIMARY CARE | Facility: CLINIC | Age: 69
End: 2024-09-30
Payer: MEDICARE

## 2024-09-30 NOTE — PROGRESS NOTES
Discharge Facility:[facility]   Discharge Diagnosis:Bilateral PE  Admission Date:9/20/24  Discharge Date: 9/27/24    PCP Appointment Date:Patient requested to schedule   Specialist Appointment Date: N/A  Hospital Encounter and Summary Linked: Yes  See discharge assessment below for further details  Engagement  Call Start Time: 1423 (9/30/2024  2:23 PM)    Medications  Medications reviewed with patient/caregiver?: Yes (9/30/2024  2:23 PM)  Is the patient having any side effects they believe may be caused by any medication additions or changes?: No (9/30/2024  2:23 PM)  Does the patient have all medications ordered at discharge?: Yes (9/30/2024  2:23 PM)  Prescription Comments: see med list (9/30/2024  2:23 PM)  Is the patient taking all medications as directed (includes completed medication regime)?: Yes (9/30/2024  2:23 PM)  Medication Comments: see med list (9/30/2024  2:23 PM)    Appointments  Does the patient have a primary care provider?: Yes (9/30/2024  2:23 PM)  Care Management Interventions: Educated patient on importance of making appointment; Advised patient to make appointment (9/30/2024  2:23 PM)  Has the patient kept scheduled appointments due by today?: Yes (9/30/2024  2:23 PM)    Patient Teaching  Does the patient have access to their discharge instructions?: Yes (9/30/2024  2:23 PM)  Care Management Interventions: Reviewed instructions with patient (9/30/2024  2:23 PM)  What is the patient's perception of their health status since discharge?: Improving (9/30/2024  2:23 PM)  Is the patient/caregiver able to teach back the hierarchy of who to call/visit for symptoms/problems? PCP, Specialist, Home Health nurse, Urgent Care, ED, 911: Yes (9/30/2024  2:23 PM)    Wrap Up  Wrap Up Additional Comments: This CM spoke with pt via phone. Pt reports doing well at home since discharge. New meds reviewed. Pt denies CP and SOB. Patient denies any further discharge questions/needs at this time. Emphasized that  Follow up is needed after discharge to review the hospital recommendations, assess your response to your treatment.  Pt aware of my availability for non-emergent concerns. Contact info provided to patient (9/30/2024  2:23 PM)        Radha Juan LPN

## 2024-10-01 ENCOUNTER — TELEPHONE (OUTPATIENT)
Dept: HOME HEALTH SERVICES | Facility: HOME HEALTH | Age: 69
End: 2024-10-01
Payer: MEDICARE

## 2024-10-01 NOTE — TELEPHONE ENCOUNTER
FYI patient was referred to [facility] for SN and PT upon hospital discharge however declined services when contacted for scheduling. Please ensure patient has a hospital follow up scheduled as they will not be monitored by home care.

## 2024-10-04 ENCOUNTER — OFFICE VISIT (OUTPATIENT)
Dept: PRIMARY CARE | Facility: CLINIC | Age: 69
End: 2024-10-04
Payer: MEDICARE

## 2024-10-04 ENCOUNTER — TELEPHONE (OUTPATIENT)
Dept: PRIMARY CARE | Facility: CLINIC | Age: 69
End: 2024-10-04

## 2024-10-04 VITALS
WEIGHT: 193.2 LBS | SYSTOLIC BLOOD PRESSURE: 134 MMHG | TEMPERATURE: 97.3 F | HEIGHT: 72 IN | DIASTOLIC BLOOD PRESSURE: 83 MMHG | OXYGEN SATURATION: 97 % | BODY MASS INDEX: 26.17 KG/M2 | HEART RATE: 83 BPM

## 2024-10-04 DIAGNOSIS — I48.91 ATRIAL FIBRILLATION, UNSPECIFIED TYPE (MULTI): Primary | ICD-10-CM

## 2024-10-04 DIAGNOSIS — I26.99 BILATERAL PULMONARY EMBOLISM (MULTI): ICD-10-CM

## 2024-10-04 PROCEDURE — 3008F BODY MASS INDEX DOCD: CPT | Performed by: FAMILY MEDICINE

## 2024-10-04 PROCEDURE — 1111F DSCHRG MED/CURRENT MED MERGE: CPT | Performed by: FAMILY MEDICINE

## 2024-10-04 PROCEDURE — 1159F MED LIST DOCD IN RCRD: CPT | Performed by: FAMILY MEDICINE

## 2024-10-04 PROCEDURE — 3079F DIAST BP 80-89 MM HG: CPT | Performed by: FAMILY MEDICINE

## 2024-10-04 PROCEDURE — 3075F SYST BP GE 130 - 139MM HG: CPT | Performed by: FAMILY MEDICINE

## 2024-10-04 PROCEDURE — 99496 TRANSJ CARE MGMT HIGH F2F 7D: CPT | Performed by: FAMILY MEDICINE

## 2024-10-04 NOTE — PROGRESS NOTES
"Subjective   Patient ID: Edward Andrade \"Jefferson\" is a 69 y.o. male who presents for Hospital Follow-up (TCM. Doing okay.) and Medication Problem (Worried that the amiodarone is giving him tremors. ).    [facility] [place]  Patient was hospitalized 9/3-9/27 for bilateral PE. Patient presented to the ED after syncopal episode and found to have saddle PE, right heart strain, hemodynamically unstable, was started on vasopressors, and sent to [facility] MICU. During hospitalization course, patient coded, also had right hemithorax, right clavicle and rib fractures.  Had hemoptysis and hematemesis with active bleeding, requiring massive transfusion protocol. Also developed A-fib with RVR.   Patient discharged on amiodarone 200mg and metoprolol 100mg BID.   Since taking amiodarone has been having tremors. Has not yet taken it today and feeling fine. Does not have appt with cardiology till next month for follow up. Will decrease amiodarone to 100mg.   Doing well otherwise. Compliant with eliquis. No chest pain, sob.     Review of Systems  General: no fever  Eyes: no blurry vision  ENT: no sore throat, no ear pain  Resp: no cough, sob or wheezing  Cardio: no chest pain, no palpitations  Abd: no nausea/vomiting  : no dysuria, no increased urinary frequency    /83   Pulse 83   Temp 36.3 °C (97.3 °F)   Ht 1.82 m (5' 11.65\")   Wt 87.6 kg (193 lb 3.2 oz)   SpO2 97%   BMI 26.46 kg/m²       Objective   Physical Exam  Gen: NAD, alert  Head: normocephalic/atraumatic  Eyes: conjunctivae normal  Ears: canals clear bilaterally, TM normal   Nose: external nose normal   Oropharynx: clear   Resp: Clear to auscultation  CVS: Regular rate and rhythm  Abdomen: soft, NT, ND  Ext: no edema, NT of lower extremities  Neuro: gait normal     Assessment/Plan   Problem List Items Addressed This Visit       Bilateral pulmonary embolism (Multi)  Continue eliquis     Other Visit Diagnoses       Atrial fibrillation, unspecified type (Multi)    -  Primary "    Relevant Orders    Referral to Cardiology  Decrease amiodarone to 100mg daily, since causing tremors

## 2024-10-04 NOTE — TELEPHONE ENCOUNTER
Patient is getting tremors, he feels it is from the amiodarone (Pacerone) 200 mg tablet.  Moved his hosp fu to today to discuss.  He is supposed to take the tablet at 9am but is experiencing tremors now, should he skip taking the dose?   room air

## 2024-10-09 ENCOUNTER — APPOINTMENT (OUTPATIENT)
Dept: ORTHOPEDIC SURGERY | Facility: CLINIC | Age: 69
End: 2024-10-09
Payer: MEDICARE

## 2024-10-10 ENCOUNTER — APPOINTMENT (OUTPATIENT)
Dept: PRIMARY CARE | Facility: CLINIC | Age: 69
End: 2024-10-10
Payer: MEDICARE

## 2024-10-11 ENCOUNTER — PATIENT OUTREACH (OUTPATIENT)
Dept: PRIMARY CARE | Facility: CLINIC | Age: 69
End: 2024-10-11
Payer: MEDICARE

## 2024-10-11 NOTE — PROGRESS NOTES
Call regarding appt. with PCP on 10/4/24 after hospitalization.  At time of outreach call the patient feels as if their condition has improved since last visit.  No questions or concerns at this time.

## 2024-10-16 ENCOUNTER — OFFICE VISIT (OUTPATIENT)
Dept: PRIMARY CARE | Facility: CLINIC | Age: 69
End: 2024-10-16

## 2024-10-16 VITALS
HEIGHT: 72 IN | TEMPERATURE: 97.7 F | DIASTOLIC BLOOD PRESSURE: 80 MMHG | WEIGHT: 194.2 LBS | BODY MASS INDEX: 26.3 KG/M2 | OXYGEN SATURATION: 97 % | SYSTOLIC BLOOD PRESSURE: 130 MMHG | HEART RATE: 68 BPM

## 2024-10-16 DIAGNOSIS — Z23 NEED FOR INFLUENZA VACCINATION: ICD-10-CM

## 2024-10-16 DIAGNOSIS — Z48.02 VISIT FOR SUTURE REMOVAL: Primary | ICD-10-CM

## 2024-10-16 PROCEDURE — 3075F SYST BP GE 130 - 139MM HG: CPT | Performed by: FAMILY MEDICINE

## 2024-10-16 PROCEDURE — 1111F DSCHRG MED/CURRENT MED MERGE: CPT | Performed by: FAMILY MEDICINE

## 2024-10-16 PROCEDURE — 1036F TOBACCO NON-USER: CPT | Performed by: FAMILY MEDICINE

## 2024-10-16 PROCEDURE — 3008F BODY MASS INDEX DOCD: CPT | Performed by: FAMILY MEDICINE

## 2024-10-16 PROCEDURE — 90662 IIV NO PRSV INCREASED AG IM: CPT | Performed by: FAMILY MEDICINE

## 2024-10-16 PROCEDURE — 99213 OFFICE O/P EST LOW 20 MIN: CPT | Performed by: FAMILY MEDICINE

## 2024-10-16 PROCEDURE — G0008 ADMIN INFLUENZA VIRUS VAC: HCPCS | Performed by: FAMILY MEDICINE

## 2024-10-16 PROCEDURE — 3079F DIAST BP 80-89 MM HG: CPT | Performed by: FAMILY MEDICINE

## 2024-10-16 PROCEDURE — 1159F MED LIST DOCD IN RCRD: CPT | Performed by: FAMILY MEDICINE

## 2024-10-16 ASSESSMENT — PATIENT HEALTH QUESTIONNAIRE - PHQ9
SUM OF ALL RESPONSES TO PHQ9 QUESTIONS 1 AND 2: 0
2. FEELING DOWN, DEPRESSED OR HOPELESS: NOT AT ALL
1. LITTLE INTEREST OR PLEASURE IN DOING THINGS: NOT AT ALL

## 2024-10-16 NOTE — PROGRESS NOTES
"Subjective   Patient ID: Edward Andrade \"Jefferson\" is a 69 y.o. male who presents for Suture / Staple Removal (Right side. 2 stitches. Been in for 3 wks. ).    Suture / Staple Removal  Here for stiches removal. Had thoracostomy tube placed and when it was removed had stitches placed need them removed.   Patient ID: Edward Andrade \"Rosanna" is a 69 y.o. male.    Suture Removal    Date/Time: 10/16/2024 2:35 PM    Performed by: Nicole Coronado MD  Authorized by: Nicole Coronado MD    Consent:     Consent obtained:  Verbal    Consent given by:  Patient  Universal protocol:     Patient identity confirmed:  Verbally with patient  Location:     Location:  [facility]  Procedure details:     Wound appearance:  No signs of infection, clean and good wound healing    Number of sutures removed:  4  Post-procedure details:     Post-removal:  Antibiotic ointment applied and Band-Aid applied    Procedure completion:  Tolerated well, no immediate complications      Doing better since amiodorone was decreased, not having tremors as much. Has not been able to follow up with cardiology.     Review of Systems  General: no fever  Eyes: no blurry vision  ENT: no sore throat, no ear pain  Resp: no cough, sob or wheezing  Cardio: no chest pain, no palpitations  Abd: no nausea/vomiting  : no dysuria, no increased urinary frequency      /80   Pulse 68   Temp 36.5 °C (97.7 °F)   Ht 1.82 m (5' 11.65\")   Wt 88.1 kg (194 lb 3.2 oz)   SpO2 97%   BMI 26.60 kg/m²       Objective   Physical Exam  Gen: NAD, alert  Head: normocephalic/atraumatic  Eyes: conjunctivae normal  Ears: canals clear bilaterally, TM normal   Nose: external nose normal   Oropharynx: clear   Resp: Clear to auscultation  CVS: Regular rate and rhythm  Abdomen: soft, NT, ND  Ext: no edema, NT of lower extremities  Back: 4 sutures in place on left flank, incision healed well, no signs of infection  Neuro: gait normal     Assessment/Plan   Problem List " Items Addressed This Visit    None  Visit Diagnoses       Visit for suture removal    -  Primary    Relevant Orders    Suture Removal    Need for influenza vaccination        Relevant Orders    Flu vaccine, trivalent, preservative free, HIGH-DOSE, age 65y+ (Fluzone) (Completed)

## 2024-10-17 ENCOUNTER — TELEPHONE (OUTPATIENT)
Dept: PRIMARY CARE | Facility: CLINIC | Age: 69
End: 2024-10-17

## 2024-10-17 DIAGNOSIS — I48.0 AF (PAROXYSMAL ATRIAL FIBRILLATION) (MULTI): ICD-10-CM

## 2024-10-17 DIAGNOSIS — I26.99 BILATERAL PULMONARY EMBOLISM (MULTI): ICD-10-CM

## 2024-10-17 RX ORDER — METOPROLOL TARTRATE 100 MG/1
100 TABLET ORAL 2 TIMES DAILY
Qty: 60 TABLET | Refills: 2 | Status: SHIPPED | OUTPATIENT
Start: 2024-10-17 | End: 2025-01-15

## 2024-10-17 RX ORDER — AMIODARONE HYDROCHLORIDE 200 MG/1
100 TABLET ORAL DAILY
Qty: 90 TABLET | Refills: 1 | Status: SHIPPED | OUTPATIENT
Start: 2024-10-17

## 2024-10-17 NOTE — TELEPHONE ENCOUNTER
Received fax from [facility].  Called patient to verify which pharmacy he wants to use.  Patient stated he does want [facility] to fill his metoprolol prescription.

## 2024-10-21 DIAGNOSIS — I26.99 BILATERAL PULMONARY EMBOLISM (MULTI): ICD-10-CM

## 2024-10-29 ENCOUNTER — PATIENT OUTREACH (OUTPATIENT)
Dept: PRIMARY CARE | Facility: CLINIC | Age: 69
End: 2024-10-29
Payer: MEDICARE

## 2024-11-05 ENCOUNTER — APPOINTMENT (OUTPATIENT)
Dept: PRIMARY CARE | Facility: CLINIC | Age: 69
End: 2024-11-05
Payer: MEDICARE

## 2024-11-05 VITALS
TEMPERATURE: 97.9 F | BODY MASS INDEX: 26.19 KG/M2 | DIASTOLIC BLOOD PRESSURE: 64 MMHG | OXYGEN SATURATION: 96 % | HEIGHT: 72 IN | SYSTOLIC BLOOD PRESSURE: 102 MMHG | WEIGHT: 193.4 LBS | HEART RATE: 64 BPM

## 2024-11-05 DIAGNOSIS — E78.5 HYPERLIPIDEMIA, UNSPECIFIED HYPERLIPIDEMIA TYPE: Primary | ICD-10-CM

## 2024-11-05 DIAGNOSIS — Z12.5 SCREENING FOR PROSTATE CANCER: ICD-10-CM

## 2024-11-05 PROCEDURE — 99213 OFFICE O/P EST LOW 20 MIN: CPT | Performed by: FAMILY MEDICINE

## 2024-11-05 PROCEDURE — 3008F BODY MASS INDEX DOCD: CPT | Performed by: FAMILY MEDICINE

## 2024-11-05 PROCEDURE — 3078F DIAST BP <80 MM HG: CPT | Performed by: FAMILY MEDICINE

## 2024-11-05 PROCEDURE — 3074F SYST BP LT 130 MM HG: CPT | Performed by: FAMILY MEDICINE

## 2024-11-05 NOTE — PROGRESS NOTES
"Subjective   Patient ID: Edward Andrade \"Jefferson\" is a 69 y.o. male who presents for Follow-up (Is doing very well. ).  HPI  Here for follow up   Doing well overall, did follow up with cardiology, going to get ECHO done   Is on amiodarone, need to monitor thyroid level    Review of Systems  General: no fever  Eyes: no blurry vision  ENT: no sore throat, no ear pain  Resp: no cough, sob or wheezing  Cardio: no chest pain, no palpitations  Abd: no nausea/vomiting  : no dysuria, no increased urinary frequency      /64   Pulse 64   Temp 36.6 °C (97.9 °F)   Ht 1.82 m (5' 11.65\")   Wt 87.7 kg (193 lb 6.4 oz)   SpO2 96%   BMI 26.49 kg/m²       Objective   Physical Exam  Gen: NAD, alert  Head: normocephalic/atraumatic  Eyes: conjunctivae normal  Ears: canals clear bilaterally, TM normal   Nose: external nose normal   Oropharynx: clear   Resp: Clear to auscultation  CVS: Regular rate and rhythm  Abdomen: soft, NT, ND  Ext: no edema, NT of lower extremities  Neuro: gait normal     Assessment/Plan   Problem List Items Addressed This Visit       Hyperlipidemia - Primary    Relevant Orders    TSH with reflex to Free T4 if abnormal     Other Visit Diagnoses       Screening for prostate cancer        Relevant Orders    Prostate Specific Antigen, Screen               "

## 2024-11-26 DIAGNOSIS — I26.99 BILATERAL PULMONARY EMBOLISM (MULTI): ICD-10-CM

## 2024-12-15 DIAGNOSIS — I48.0 AF (PAROXYSMAL ATRIAL FIBRILLATION) (MULTI): ICD-10-CM

## 2024-12-16 RX ORDER — METOPROLOL TARTRATE 100 MG/1
100 TABLET ORAL 2 TIMES DAILY
Qty: 180 TABLET | Refills: 3 | Status: SHIPPED | OUTPATIENT
Start: 2024-12-16

## 2024-12-26 ENCOUNTER — PATIENT OUTREACH (OUTPATIENT)
Dept: PRIMARY CARE | Facility: CLINIC | Age: 69
End: 2024-12-26
Payer: MEDICARE

## 2025-03-11 ENCOUNTER — APPOINTMENT (OUTPATIENT)
Dept: PRIMARY CARE | Facility: CLINIC | Age: 70
End: 2025-03-11
Payer: MEDICARE

## 2025-03-11 VITALS
HEART RATE: 73 BPM | DIASTOLIC BLOOD PRESSURE: 84 MMHG | TEMPERATURE: 97.2 F | OXYGEN SATURATION: 97 % | WEIGHT: 201 LBS | BODY MASS INDEX: 28.14 KG/M2 | HEIGHT: 71 IN | SYSTOLIC BLOOD PRESSURE: 120 MMHG

## 2025-03-11 DIAGNOSIS — Z00.00 MEDICARE ANNUAL WELLNESS VISIT, SUBSEQUENT: Primary | ICD-10-CM

## 2025-03-11 DIAGNOSIS — Z11.3 SCREEN FOR STD (SEXUALLY TRANSMITTED DISEASE): ICD-10-CM

## 2025-03-11 DIAGNOSIS — I46.9 CARDIAC ARREST: ICD-10-CM

## 2025-03-11 DIAGNOSIS — I26.99 BILATERAL PULMONARY EMBOLISM (MULTI): ICD-10-CM

## 2025-03-11 DIAGNOSIS — Z12.11 SPECIAL SCREENING FOR MALIGNANT NEOPLASM OF COLON: ICD-10-CM

## 2025-03-11 DIAGNOSIS — I48.0 PAROXYSMAL ATRIAL FIBRILLATION (MULTI): ICD-10-CM

## 2025-03-11 DIAGNOSIS — E78.2 MIXED HYPERLIPIDEMIA: ICD-10-CM

## 2025-03-11 DIAGNOSIS — Z12.5 SCREENING FOR PROSTATE CANCER: ICD-10-CM

## 2025-03-11 PROBLEM — I48.91 ATRIAL FIBRILLATION, UNSPECIFIED TYPE (MULTI): Status: ACTIVE | Noted: 2025-03-11

## 2025-03-11 PROCEDURE — 3079F DIAST BP 80-89 MM HG: CPT | Performed by: FAMILY MEDICINE

## 2025-03-11 PROCEDURE — 1036F TOBACCO NON-USER: CPT | Performed by: FAMILY MEDICINE

## 2025-03-11 PROCEDURE — 1159F MED LIST DOCD IN RCRD: CPT | Performed by: FAMILY MEDICINE

## 2025-03-11 PROCEDURE — 1170F FXNL STATUS ASSESSED: CPT | Performed by: FAMILY MEDICINE

## 2025-03-11 PROCEDURE — 3008F BODY MASS INDEX DOCD: CPT | Performed by: FAMILY MEDICINE

## 2025-03-11 PROCEDURE — G0439 PPPS, SUBSEQ VISIT: HCPCS | Performed by: FAMILY MEDICINE

## 2025-03-11 PROCEDURE — 3074F SYST BP LT 130 MM HG: CPT | Performed by: FAMILY MEDICINE

## 2025-03-11 ASSESSMENT — ACTIVITIES OF DAILY LIVING (ADL)
BATHING: INDEPENDENT
GROCERY_SHOPPING: INDEPENDENT
TAKING_MEDICATION: INDEPENDENT
DRESSING: INDEPENDENT
MANAGING_FINANCES: INDEPENDENT
DOING_HOUSEWORK: INDEPENDENT

## 2025-03-11 ASSESSMENT — PATIENT HEALTH QUESTIONNAIRE - PHQ9
SUM OF ALL RESPONSES TO PHQ9 QUESTIONS 1 AND 2: 0
1. LITTLE INTEREST OR PLEASURE IN DOING THINGS: NOT AT ALL
2. FEELING DOWN, DEPRESSED OR HOPELESS: NOT AT ALL

## 2025-03-11 NOTE — PATIENT INSTRUCTIONS
Please call the cologuard number that was given to you to make sure it is covered by your insurance before you do it.

## 2025-03-11 NOTE — PROGRESS NOTES
"Subjective   Reason for Visit: Edward Andrade is an 70 y.o. male here for a Medicare Wellness visit.     Past Medical, Surgical, and Family History reviewed and updated in chart.    Reviewed all medications by prescribing practitioner or clinical pharmacist (such as prescriptions, OTCs, herbal therapies and supplements) and documented in the medical record.    Chief Complaint   Patient presents with    Medicare Annual Wellness Visit Subsequent     Has questions about his medications. His coffee a conflict with his meds?   Discuss vaccines: Covid, RSV, PNA         HPI  Here for medicare annual visit  Has not yet gotten his blood work done, got blood transfusion and would like to get checked for Hepatitis C and HIV.   Had bilateral PE, A-fib and cardiac arrest, on eliquis 5mg BID. Wondering how long he has to remain on it as well, whether he can cut down on dosage. Did follow up with cardiology, Amiadorone was discontinued.       Patient Care Team:  Nicole Coronado MD as PCP - General  Nicole Coronado MD as PCP - [employer] PCP     Review of Systems  General: no fever  Eyes: no blurry vision  ENT: no sore throat, no ear pain  Resp: no cough, sob or wheezing  Cardio: no chest pain, no palpitations  Abd: no nausea/vomiting  : no dysuria, no increased urinary frequency    Objective   Vitals:  /84   Pulse 73   Temp 36.2 °C (97.2 °F)   Ht 1.803 m (5' 11\")   Wt 91.2 kg (201 lb)   SpO2 97%   BMI 28.03 kg/m²       Physical Exam  Gen: NAD, alert  Head: normocephalic/atraumatic  Eyes: conjunctivae normal  Ears: canals clear bilaterally, TM normal   Nose: external nose normal   Oropharynx: clear   Resp: Clear to auscultation  CVS: Regular rate and rhythm  Abdomen: soft, NT, ND  Ext: no edema, NT of lower extremities  Neuro: gait normal     Assessment & Plan  Medicare annual wellness visit, subsequent         Bilateral pulmonary embolism (Multi)    Orders:    Referral To " Hematology and Oncology; Future    Cardiac arrest         Paroxysmal atrial fibrillation (Multi)  Continue follow up with cardiology       Screen for STD (sexually transmitted disease)    Orders:    Hepatitis C antibody; Future    HIV 1/2 Antigen/Antibody Screen with Reflex to Confirmation; Future    Screening for prostate cancer    Orders:    Prostate Specific Antigen, Screen; Future    Mixed hyperlipidemia    Orders:    TSH with reflex to Free T4 if abnormal; Future    BMI 28.0-28.9,adult         Special screening for malignant neoplasm of colon    Orders:    Cologuard® colon cancer screening; Future

## 2025-03-18 ENCOUNTER — LAB (OUTPATIENT)
Dept: LAB | Facility: HOSPITAL | Age: 70
End: 2025-03-18
Payer: MEDICARE

## 2025-03-18 ENCOUNTER — OFFICE VISIT (OUTPATIENT)
Dept: HEMATOLOGY/ONCOLOGY | Facility: HOSPITAL | Age: 70
End: 2025-03-18
Payer: MEDICARE

## 2025-03-18 VITALS
WEIGHT: 202.4 LBS | DIASTOLIC BLOOD PRESSURE: 79 MMHG | SYSTOLIC BLOOD PRESSURE: 129 MMHG | HEART RATE: 65 BPM | TEMPERATURE: 98 F | BODY MASS INDEX: 28.34 KG/M2 | OXYGEN SATURATION: 98 % | RESPIRATION RATE: 16 BRPM | HEIGHT: 71 IN

## 2025-03-18 DIAGNOSIS — D64.9 FATIGUE ASSOCIATED WITH ANEMIA: ICD-10-CM

## 2025-03-18 DIAGNOSIS — I26.99 BILATERAL PULMONARY EMBOLISM (MULTI): Primary | ICD-10-CM

## 2025-03-18 LAB
ALBUMIN SERPL BCP-MCNC: 4.3 G/DL (ref 3.4–5)
ALP SERPL-CCNC: 62 U/L (ref 33–136)
ALT SERPL W P-5'-P-CCNC: 28 U/L (ref 10–52)
ANION GAP SERPL CALC-SCNC: 11 MMOL/L (ref 10–20)
AST SERPL W P-5'-P-CCNC: 21 U/L (ref 9–39)
BASOPHILS # BLD AUTO: 0.05 X10*3/UL (ref 0–0.1)
BASOPHILS NFR BLD AUTO: 0.7 %
BILIRUB SERPL-MCNC: 0.6 MG/DL (ref 0–1.2)
BUN SERPL-MCNC: 24 MG/DL (ref 6–23)
CALCIUM SERPL-MCNC: 9.6 MG/DL (ref 8.6–10.3)
CHLORIDE SERPL-SCNC: 105 MMOL/L (ref 98–107)
CO2 SERPL-SCNC: 28 MMOL/L (ref 21–32)
CREAT SERPL-MCNC: 1.08 MG/DL (ref 0.5–1.3)
EGFRCR SERPLBLD CKD-EPI 2021: 73 ML/MIN/1.73M*2
EOSINOPHIL # BLD AUTO: 0.18 X10*3/UL (ref 0–0.4)
EOSINOPHIL NFR BLD AUTO: 2.5 %
ERYTHROCYTE [DISTWIDTH] IN BLOOD BY AUTOMATED COUNT: 13.7 % (ref 11.5–14.5)
FERRITIN SERPL-MCNC: 155 NG/ML (ref 20–300)
GLUCOSE SERPL-MCNC: 92 MG/DL (ref 74–99)
HCT VFR BLD AUTO: 46.8 % (ref 41–52)
HGB BLD-MCNC: 15.2 G/DL (ref 13.5–17.5)
IMM GRANULOCYTES # BLD AUTO: 0.04 X10*3/UL (ref 0–0.5)
IMM GRANULOCYTES NFR BLD AUTO: 0.6 % (ref 0–0.9)
IRON SATN MFR SERPL: 39 % (ref 25–45)
IRON SERPL-MCNC: 138 UG/DL (ref 35–150)
LDH SERPL L TO P-CCNC: 140 U/L (ref 84–246)
LYMPHOCYTES # BLD AUTO: 1.17 X10*3/UL (ref 0.8–3)
LYMPHOCYTES NFR BLD AUTO: 16.3 %
MCH RBC QN AUTO: 32 PG (ref 26–34)
MCHC RBC AUTO-ENTMCNC: 32.5 G/DL (ref 32–36)
MCV RBC AUTO: 99 FL (ref 80–100)
MONOCYTES # BLD AUTO: 0.73 X10*3/UL (ref 0.05–0.8)
MONOCYTES NFR BLD AUTO: 10.2 %
NEUTROPHILS # BLD AUTO: 5.02 X10*3/UL (ref 1.6–5.5)
NEUTROPHILS NFR BLD AUTO: 69.7 %
NRBC BLD-RTO: 0 /100 WBCS (ref 0–0)
PLATELET # BLD AUTO: 293 X10*3/UL (ref 150–450)
POTASSIUM SERPL-SCNC: 4.8 MMOL/L (ref 3.5–5.3)
PROT SERPL-MCNC: 7.4 G/DL (ref 6.4–8.2)
RBC # BLD AUTO: 4.75 X10*6/UL (ref 4.5–5.9)
SODIUM SERPL-SCNC: 139 MMOL/L (ref 136–145)
TIBC SERPL-MCNC: 356 UG/DL (ref 240–445)
TSH SERPL-ACNC: 2.98 MIU/L (ref 0.44–3.98)
UIBC SERPL-MCNC: 218 UG/DL (ref 110–370)
WBC # BLD AUTO: 7.2 X10*3/UL (ref 4.4–11.3)

## 2025-03-18 PROCEDURE — 99215 OFFICE O/P EST HI 40 MIN: CPT | Performed by: INTERNAL MEDICINE

## 2025-03-18 PROCEDURE — 83550 IRON BINDING TEST: CPT

## 2025-03-18 PROCEDURE — 99205 OFFICE O/P NEW HI 60 MIN: CPT | Performed by: INTERNAL MEDICINE

## 2025-03-18 PROCEDURE — 3008F BODY MASS INDEX DOCD: CPT | Performed by: INTERNAL MEDICINE

## 2025-03-18 PROCEDURE — 83615 LACTATE (LD) (LDH) ENZYME: CPT

## 2025-03-18 PROCEDURE — 84443 ASSAY THYROID STIM HORMONE: CPT

## 2025-03-18 PROCEDURE — 1126F AMNT PAIN NOTED NONE PRSNT: CPT | Performed by: INTERNAL MEDICINE

## 2025-03-18 PROCEDURE — 82728 ASSAY OF FERRITIN: CPT

## 2025-03-18 PROCEDURE — 80053 COMPREHEN METABOLIC PANEL: CPT

## 2025-03-18 PROCEDURE — 3078F DIAST BP <80 MM HG: CPT | Performed by: INTERNAL MEDICINE

## 2025-03-18 PROCEDURE — 85025 COMPLETE CBC W/AUTO DIFF WBC: CPT

## 2025-03-18 PROCEDURE — 36415 COLL VENOUS BLD VENIPUNCTURE: CPT

## 2025-03-18 PROCEDURE — 83540 ASSAY OF IRON: CPT

## 2025-03-18 PROCEDURE — 1159F MED LIST DOCD IN RCRD: CPT | Performed by: INTERNAL MEDICINE

## 2025-03-18 PROCEDURE — 3074F SYST BP LT 130 MM HG: CPT | Performed by: INTERNAL MEDICINE

## 2025-03-18 ASSESSMENT — ENCOUNTER SYMPTOMS
CARDIOVASCULAR NEGATIVE: 1
DEPRESSION: 0
LOSS OF SENSATION IN FEET: 0
GASTROINTESTINAL NEGATIVE: 1
RESPIRATORY NEGATIVE: 1
FATIGUE: 1
OCCASIONAL FEELINGS OF UNSTEADINESS: 0

## 2025-03-18 ASSESSMENT — COLUMBIA-SUICIDE SEVERITY RATING SCALE - C-SSRS
6. HAVE YOU EVER DONE ANYTHING, STARTED TO DO ANYTHING, OR PREPARED TO DO ANYTHING TO END YOUR LIFE?: NO
2. HAVE YOU ACTUALLY HAD ANY THOUGHTS OF KILLING YOURSELF?: NO
1. IN THE PAST MONTH, HAVE YOU WISHED YOU WERE DEAD OR WISHED YOU COULD GO TO SLEEP AND NOT WAKE UP?: NO

## 2025-03-18 ASSESSMENT — PAIN SCALES - GENERAL: PAINLEVEL_OUTOF10: 0-NO PAIN

## 2025-03-18 NOTE — PROGRESS NOTES
"Patient ID: Jefferson Andrade is a 70 y.o. male.  Referring Physician: Nicole Coronado MD  [address] [place] [address]  Primary Care Provider: Nicole Coronado MD  Referral Reason: Pulmonary emboli    Subjective:  Hurt her hip in Aug 2024 => Had difficulty moving around. One night in Sep 2024, had syncope => Found to have large saddle PE with RV strain + multiple rib fractures. Also had cardiac arrest requiring CPR => Had heparin + tPA => Developed hemothorax due to bleeding from intercostal arteries => Had embolization of arteries and heparin held and IVC filter placed => 20 days of hospital stay.     Was placed on Eliquis and discharged home. Doing well now. Denies past h/o VTEs or blood disorder.     Assessment/Plan:  ? B/L PE: This was a nearly fatal PE that required CPR, tPA, and anti-coagulation. Although it seems to have been provoked due to immobilization, we cannot risk it happening again => Requires lifelong anti-cpagulation.     He is on Eliquis 5 mg po BID and is tolerating it well. This can be reduced to 2.5 mg PO BID. However, he has an IVC filter placed at [facility] and we better remove it before reducing the dose.     Review Of Systems:  Review of Systems   Constitutional:  Positive for fatigue.   HENT:  Negative.     Respiratory: Negative.     Cardiovascular: Negative.    Gastrointestinal: Negative.    Genitourinary: Negative.         Physical Exam:  /79 (BP Location: Left arm, Patient Position: Sitting, BP Cuff Size: Adult)   Pulse 65   Temp 36.7 °C (98 °F) (Temporal)   Resp 16   Ht 1.803 m (5' 11\")   Wt 91.8 kg (202 lb 6.4 oz)   SpO2 98%   BMI 28.23 kg/m²   BSA: 2.14 meters squared  Performance Status: Asymptomatic  Physical Exam  HENT:      Head: Normocephalic and atraumatic.   Eyes:      General: No scleral icterus.     Pupils: Pupils are equal, round, and reactive to light.   Cardiovascular:      Rate and Rhythm: Normal rate and regular rhythm. "   Pulmonary:      Effort: Pulmonary effort is normal.   Musculoskeletal:         General: Normal range of motion.   Skin:     Coloration: Skin is not jaundiced.   Neurological:      General: No focal deficit present.      Mental Status: He is alert and oriented to person, place, and time.         Results:  Diagnostic Results   Lab Results   Component Value Date    WBC 7.3 09/25/2024    HGB 11.7 (L) 09/25/2024    HCT 37.2 (L) 09/25/2024    MCV 99 09/25/2024     (H) 09/25/2024     Lab Results   Component Value Date    CALCIUM 9.6 09/25/2024     09/25/2024    K 4.2 09/25/2024    CO2 29 09/25/2024     09/25/2024    BUN 12 09/25/2024    CREATININE 0.87 09/25/2024    ALT 19 09/27/2023    AST 15 09/27/2023       Current Outpatient Medications:     apixaban (Eliquis) 5 mg tablet, Take 1 tablet (5 mg) by mouth 2 times a day., Disp: 60 tablet, Rfl: 3    latanoprostene bunod (Vyzulta) 0.024 % drops, Administer 1 drop into both eyes once daily at bedtime., Disp: 5 mL, Rfl: 2    metoprolol tartrate (Lopressor) 100 mg tablet, TAKE 1 TABLET BY MOUTH TWICE  DAILY, Disp: 180 tablet, Rfl: 3     Past Surgical History:   Procedure Laterality Date    NO PAST SURGERIES       Family History   Problem Relation Name Age of Onset    Diabetes Mother      Heart block Mother      Glaucoma Father      Heart block Father        reports that he has never smoked. He has never used smokeless tobacco.  Social History     Socioeconomic History    Marital status:      Spouse name: Not on file    Number of children: Not on file    Years of education: Not on file    Highest education level: Not on file   Occupational History    Not on file   Tobacco Use    Smoking status: Never    Smokeless tobacco: Never   Substance and Sexual Activity    Alcohol use: Yes     Alcohol/week: 2.0 standard drinks of alcohol     Types: 2 Cans of beer per week    Drug use: Never    Sexual activity: Not on file   Other Topics Concern    Not on file    Social History Narrative    Not on file     Social Drivers of Health     Financial Resource Strain: Low Risk  (9/20/2024)    Overall Financial Resource Strain (CARDIA)     Difficulty of Paying Living Expenses: Not hard at all   Food Insecurity: No Food Insecurity (9/26/2024)    Hunger Vital Sign     Worried About Running Out of Food in the Last Year: Never true     Ran Out of Food in the Last Year: Never true   Transportation Needs: No Transportation Needs (9/20/2024)    PRAPARE - Transportation     Lack of Transportation (Medical): No     Lack of Transportation (Non-Medical): No   Physical Activity: Not on file   Stress: Not on file   Social Connections: Not on file   Intimate Partner Violence: Not At Risk (9/26/2024)    Humiliation, Afraid, Rape, and Kick questionnaire     Fear of Current or Ex-Partner: No     Emotionally Abused: No     Physically Abused: No     Sexually Abused: No   Housing Stability: Low Risk  (9/20/2024)    Housing Stability Vital Sign     Unable to Pay for Housing in the Last Year: No     Number of Times Moved in the Last Year: 1     Homeless in the Last Year: No       Diagnoses and all orders for this visit:  Bilateral pulmonary embolism (Multi)  -     Referral To Hematology and Oncology     Marco Olguin MD

## 2025-03-19 LAB
HCV AB SERPL QL IA: NORMAL
HIV 1+2 AB+HIV1 P24 AG SERPL QL IA: NORMAL
PSA SERPL-MCNC: 0.76 NG/ML
TSH SERPL-ACNC: 3.02 MIU/L (ref 0.4–4.5)

## 2025-03-29 LAB — NONINV COLON CA DNA+OCC BLD SCRN STL QL: POSITIVE

## 2025-03-30 DIAGNOSIS — Z12.11 SCREENING FOR COLON CANCER: Primary | ICD-10-CM

## 2025-03-30 DIAGNOSIS — R19.5 POSITIVE COLORECTAL CANCER SCREENING USING COLOGUARD TEST: ICD-10-CM

## 2025-04-01 ENCOUNTER — TELEPHONE (OUTPATIENT)
Dept: HEMATOLOGY/ONCOLOGY | Facility: HOSPITAL | Age: 70
End: 2025-04-01
Payer: MEDICARE

## 2025-04-01 NOTE — TELEPHONE ENCOUNTER
Dr Olguin - you placed and order for an IVC filter retrieval? I had him scheduled for Friday 4/4 and he cancelled is appointment. I just wanted to make you aware. Thank you per secure message.     June, can you check with him why he canceled it? Per Dr. Gold secure chat            Patient returned phone call. Patient states he cancelled the IVC FILTER REMOVAL that was scheduled for this Friday, 4/4 because he caught a really bad cold from his daughter. Offered to assist with rescheduling. Patient declined and states he has the call back number to reschedule once he is better. Per Lorene OLVERA MA secure chat.

## 2025-04-04 ENCOUNTER — APPOINTMENT (OUTPATIENT)
Dept: CARDIOLOGY | Facility: HOSPITAL | Age: 70
End: 2025-04-04
Payer: MEDICARE

## 2025-04-09 ENCOUNTER — OFFICE VISIT (OUTPATIENT)
Dept: SURGERY | Facility: CLINIC | Age: 70
End: 2025-04-09
Payer: MEDICARE

## 2025-04-09 VITALS
WEIGHT: 202 LBS | DIASTOLIC BLOOD PRESSURE: 86 MMHG | TEMPERATURE: 97.3 F | SYSTOLIC BLOOD PRESSURE: 147 MMHG | HEIGHT: 71 IN | HEART RATE: 60 BPM | BODY MASS INDEX: 28.28 KG/M2

## 2025-04-09 DIAGNOSIS — Z79.01 ON CONTINUOUS ORAL ANTICOAGULATION: ICD-10-CM

## 2025-04-09 DIAGNOSIS — I26.99 BILATERAL PULMONARY EMBOLISM (MULTI): Primary | ICD-10-CM

## 2025-04-09 DIAGNOSIS — R19.5 POSITIVE COLORECTAL CANCER SCREENING USING COLOGUARD TEST: ICD-10-CM

## 2025-04-09 PROCEDURE — 1036F TOBACCO NON-USER: CPT | Performed by: SURGERY

## 2025-04-09 PROCEDURE — 1159F MED LIST DOCD IN RCRD: CPT | Performed by: SURGERY

## 2025-04-09 PROCEDURE — 1160F RVW MEDS BY RX/DR IN RCRD: CPT | Performed by: SURGERY

## 2025-04-09 PROCEDURE — 3079F DIAST BP 80-89 MM HG: CPT | Performed by: SURGERY

## 2025-04-09 PROCEDURE — 99203 OFFICE O/P NEW LOW 30 MIN: CPT | Performed by: SURGERY

## 2025-04-09 PROCEDURE — 3077F SYST BP >= 140 MM HG: CPT | Performed by: SURGERY

## 2025-04-09 PROCEDURE — 3008F BODY MASS INDEX DOCD: CPT | Performed by: SURGERY

## 2025-04-09 NOTE — PATIENT INSTRUCTIONS
As we discussed you have elected to not undergo a colonoscopy because of the risk of possible recurrent deep vein thrombosis and pulmonary embolus.  You are aware of the positive Cologuard being a marker for possible polyp or malignancy.  At this time you like to wait and will follow-up to have your filter  removed.  If you do decide to consider colonoscopy the future once you been cleared to stop your Eliquis for 48 hours you can reconsider colonoscopy.  I will see you as needed

## 2025-04-09 NOTE — PROGRESS NOTES
"Subjective   Patient ID: Edward Andrade \"Rosanna" is a 70 y.o. male who presents for a positive Cologuard    HPI   This the patient to his status post massive pulmonary embolus with cardiac arrest and eventually resuscitation who is now on Eliquis twice a day.  He has been extensively worked up.  He also has a vena cava filter in.  He was due to have this removed but had to cancel his appointment.  He was seen by hematology recently with a recommendation to consider decreasing Eliquis down to 2.5 mg twice a day.  He will require lifelong anticoagulation.  He is aware of the risk of recurrent DVT and pulm embolus if he were to stop his Eliquis  Past Medical History:   Diagnosis Date    Anxiety disorder, unspecified 11/05/2020    Anxiety and depression    Arthritis of knee, left 06/13/2023    Glaucoma 06/13/2023    Tick bite 06/13/2023        Current Outpatient Medications on File Prior to Visit   Medication Sig Dispense Refill    apixaban (Eliquis) 5 mg tablet Take 1 tablet (5 mg) by mouth 2 times a day. 60 tablet 3    latanoprostene bunod (Vyzulta) 0.024 % drops Administer 1 drop into both eyes once daily at bedtime. 5 mL 2    metoprolol tartrate (Lopressor) 100 mg tablet TAKE 1 TABLET BY MOUTH TWICE  DAILY 180 tablet 3     No current facility-administered medications on file prior to visit.        Review of Systems   All other systems reviewed and are negative.      Vitals:    04/09/25 1219   BP: 147/86   Pulse: 60   Temp: 36.3 °C (97.3 °F)        Objective     Physical Exam  Constitutional:       Appearance: Normal appearance.   Abdominal:      Palpations: Abdomen is soft. There is no mass.      Tenderness: There is no abdominal tenderness. There is no guarding.         Problem List Items Addressed This Visit       Bilateral pulmonary embolism (Multi) - Primary    Positive colorectal cancer screening using Cologuard test    On continuous oral anticoagulation        Assessment/Plan   Plan-we had a long discussion " regarding the risk-benefit ratio for colonoscopy.  He will be required to stop his Eliquis for at least 48 hours.  There is certainly a risk of recurrent DVT and pulmonary embolus and the patient is not keen to consider the procedure for this risk.  He is aware of the risk of the possibility of a missed colon polyp or cancer.  He is at the cutoff point where you could consider stopping anticoagulation but he would not like to do this at this time.  He would like at this time to forego colonoscopy until a later date and he will contact me if he wishes to have this performed.    Kimo Villanueva MD

## 2025-04-14 DIAGNOSIS — I26.99 BILATERAL PULMONARY EMBOLISM (MULTI): ICD-10-CM

## 2025-04-14 NOTE — TELEPHONE ENCOUNTER
Patient requested 30 days.    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

## 2025-04-17 ENCOUNTER — TELEPHONE (OUTPATIENT)
Dept: PRIMARY CARE | Facility: CLINIC | Age: 70
End: 2025-04-17
Payer: MEDICARE

## 2025-04-17 NOTE — TELEPHONE ENCOUNTER
Caller: Jefferson     Concern/Symptoms: Jefferson was looking through his MyChart and noticed that it said he received two implantable devices and he would like to know what they are.      Found operative/procedure report dated 09/05/2024 from [facility].      SURGERY/PROCEDURE(S):  Argon Option Elite filter placed in infra-renal IVC via right IJV access.     ANESTHESIA: Local     SURGERY/PROCEDURE DETAILS: see above     PRE-OP/PRE-PROCEDURE DIAGNOSIS: DVT, PE, chest wall bleeding     POST-OP/POST-PROCEDURE DIAGNOSIS: Same as Preop     Please advise.

## 2025-04-17 NOTE — TELEPHONE ENCOUNTER
Michael De La Garza, can you please check with patient where he is seeing this on my chart if he is looking at his [facility] my chart, or if it was when he was in the hospital here  Thanks

## 2025-04-29 ENCOUNTER — OFFICE VISIT (OUTPATIENT)
Dept: HEMATOLOGY/ONCOLOGY | Facility: HOSPITAL | Age: 70
End: 2025-04-29
Payer: MEDICARE

## 2025-04-29 VITALS
WEIGHT: 205.36 LBS | DIASTOLIC BLOOD PRESSURE: 83 MMHG | HEIGHT: 71 IN | SYSTOLIC BLOOD PRESSURE: 131 MMHG | HEART RATE: 68 BPM | OXYGEN SATURATION: 98 % | RESPIRATION RATE: 16 BRPM | TEMPERATURE: 96.4 F | BODY MASS INDEX: 28.75 KG/M2

## 2025-04-29 DIAGNOSIS — I26.99 BILATERAL PULMONARY EMBOLISM (MULTI): Primary | ICD-10-CM

## 2025-04-29 DIAGNOSIS — Z09 ENCOUNTER FOR FOLLOW-UP EXAMINATION AFTER COMPLETED TREATMENT FOR CONDITIONS OTHER THAN MALIGNANT NEOPLASM: ICD-10-CM

## 2025-04-29 PROCEDURE — G2211 COMPLEX E/M VISIT ADD ON: HCPCS | Performed by: INTERNAL MEDICINE

## 2025-04-29 PROCEDURE — 3008F BODY MASS INDEX DOCD: CPT | Performed by: INTERNAL MEDICINE

## 2025-04-29 PROCEDURE — 3079F DIAST BP 80-89 MM HG: CPT | Performed by: INTERNAL MEDICINE

## 2025-04-29 PROCEDURE — 3075F SYST BP GE 130 - 139MM HG: CPT | Performed by: INTERNAL MEDICINE

## 2025-04-29 PROCEDURE — 1159F MED LIST DOCD IN RCRD: CPT | Performed by: INTERNAL MEDICINE

## 2025-04-29 PROCEDURE — 99215 OFFICE O/P EST HI 40 MIN: CPT | Performed by: INTERNAL MEDICINE

## 2025-04-29 PROCEDURE — 1126F AMNT PAIN NOTED NONE PRSNT: CPT | Performed by: INTERNAL MEDICINE

## 2025-04-29 ASSESSMENT — ENCOUNTER SYMPTOMS
RESPIRATORY NEGATIVE: 1
GASTROINTESTINAL NEGATIVE: 1
FATIGUE: 1
CARDIOVASCULAR NEGATIVE: 1

## 2025-04-29 ASSESSMENT — PAIN SCALES - GENERAL: PAINLEVEL_OUTOF10: 0-NO PAIN

## 2025-04-29 NOTE — PROGRESS NOTES
Patient ID: Jefferson Andrade is a 70 y.o. male.    Subjective:  Referred for B/L PE. My initial note in Mar 2025 was as follows:  Hurt her hip in Aug 2024 => Had difficulty moving around. One night in Sep 2024, had syncope => Found to have large saddle PE with RV strain + multiple rib fractures. Also had cardiac arrest requiring CPR => Had heparin + tPA => Developed hemothorax due to bleeding from intercostal arteries => Had embolization of arteries and heparin held and IVC filter placed => 20 days of hospital stay.   Was placed on Eliquis and discharged home. Doing well now. Denies past h/o VTEs or blood disorder.    Feels OK today. Denies leg swelling. Has not seen blood in urine or stool. Takes Eliquis 5 mg BID.   Had to cancel IVC removal appointment because he had a cold.      Assessment/Plan:  ? B/L PE: This was a nearly fatal PE that required CPR, tPA, and anti-coagulation. Although it seems to have been provoked due to immobilization, we cannot risk it happening again => Requires lifelong anti-cpagulation.     He is on Eliquis 5 mg po BID and is tolerating it well. This can be reduced to 2.5 mg PO BID. However, he has an IVC filter placed at [facility] and we better remove it before reducing the dose. He had to cancel his  appointment to remove it. Will reschdeule it. Meanwhile, we will get a US Duplex of both lower extremities to r/o current clots.     Positive Cologuard: He needs a colonoscopy but could not be done because he is on Eliquis. I stated that it may be done by switching to Lovenox but he wants to have it done after IVC is removed. Will check CEA next visit.     Review Of Systems:  Review of Systems   Constitutional:  Positive for fatigue.   HENT:  Negative.     Respiratory: Negative.     Cardiovascular: Negative.    Gastrointestinal: Negative.    Genitourinary: Negative.         Physical Exam:  /83 (BP Location: Left arm, Patient Position: Sitting, BP Cuff Size: Adult)   Pulse 68    "Temp 35.8 °C (96.4 °F) (Temporal)   Resp 16   Ht 1.803 m (5' 11\")   Wt 93.2 kg (205 lb 5.7 oz)   SpO2 98%   BMI 28.64 kg/m²   BSA: 2.16 meters squared  Performance Status: Asymptomatic  Physical Exam  HENT:      Head: Normocephalic and atraumatic.   Eyes:      General: No scleral icterus.     Pupils: Pupils are equal, round, and reactive to light.   Cardiovascular:      Rate and Rhythm: Normal rate and regular rhythm.   Pulmonary:      Effort: Pulmonary effort is normal.   Musculoskeletal:         General: Normal range of motion.   Skin:     Coloration: Skin is not jaundiced.   Neurological:      General: No focal deficit present.      Mental Status: He is alert and oriented to person, place, and time.         Results:  Diagnostic Results   Lab Results   Component Value Date    WBC 7.2 03/18/2025    HGB 15.2 03/18/2025    HCT 46.8 03/18/2025    MCV 99 03/18/2025     03/18/2025     Lab Results   Component Value Date    CALCIUM 9.6 03/18/2025     03/18/2025    K 4.8 03/18/2025    CO2 28 03/18/2025     03/18/2025    BUN 24 (H) 03/18/2025    CREATININE 1.08 03/18/2025    ALT 28 03/18/2025    AST 21 03/18/2025       Current Outpatient Medications:     apixaban (Eliquis) 5 mg tablet, Take 1 tablet (5 mg) by mouth 2 times a day., Disp: 60 tablet, Rfl: 0    latanoprostene bunod (Vyzulta) 0.024 % drops, Administer 1 drop into both eyes once daily at bedtime., Disp: 5 mL, Rfl: 2    metoprolol tartrate (Lopressor) 100 mg tablet, TAKE 1 TABLET BY MOUTH TWICE  DAILY, Disp: 180 tablet, Rfl: 3     Past Surgical History:   Procedure Laterality Date    NO PAST SURGERIES       Family History   Problem Relation Name Age of Onset    Diabetes Mother      Heart block Mother      Glaucoma Father      Heart block Father        reports that he has never smoked. He has never used smokeless tobacco.  Social History     Socioeconomic History    Marital status:      Spouse name: Not on file    Number of children: " Not on file    Years of education: Not on file    Highest education level: Not on file   Occupational History    Not on file   Tobacco Use    Smoking status: Never    Smokeless tobacco: Never   Substance and Sexual Activity    Alcohol use: Yes     Alcohol/week: 2.0 standard drinks of alcohol     Types: 2 Cans of beer per week    Drug use: Never    Sexual activity: Not on file   Other Topics Concern    Not on file   Social History Narrative    Not on file     Social Drivers of Health     Financial Resource Strain: Low Risk  (9/20/2024)    Overall Financial Resource Strain (CARDIA)     Difficulty of Paying Living Expenses: Not hard at all   Food Insecurity: No Food Insecurity (9/26/2024)    Hunger Vital Sign     Worried About Running Out of Food in the Last Year: Never true     Ran Out of Food in the Last Year: Never true   Transportation Needs: No Transportation Needs (9/20/2024)    PRAPARE - Transportation     Lack of Transportation (Medical): No     Lack of Transportation (Non-Medical): No   Physical Activity: Not on file   Stress: Not on file   Social Connections: Not on file   Intimate Partner Violence: Not At Risk (9/26/2024)    Humiliation, Afraid, Rape, and Kick questionnaire     Fear of Current or Ex-Partner: No     Emotionally Abused: No     Physically Abused: No     Sexually Abused: No   Housing Stability: Low Risk  (9/20/2024)    Housing Stability Vital Sign     Unable to Pay for Housing in the Last Year: No     Number of Times Moved in the Last Year: 1     Homeless in the Last Year: No       Diagnoses and all orders for this visit:  Bilateral pulmonary embolism (Multi)  -     Clinic Appointment Request Follow up  -     Clinic Appointment Request; Future  -     CBC and Auto Differential; Future  -     Comprehensive Metabolic Panel; Future  -     Carcinoembryonic Antigen; Future  -     Vascular US lower extremity venous duplex bilateral; Future  Encounter for follow-up examination after completed treatment  for conditions other than malignant neoplasm  -     Carcinoembryonic Antigen; Future     Marco Olguin MD

## 2025-05-04 DIAGNOSIS — I26.99 BILATERAL PULMONARY EMBOLISM (MULTI): ICD-10-CM

## 2025-05-05 RX ORDER — APIXABAN 5 MG/1
5 TABLET, FILM COATED ORAL 2 TIMES DAILY
Qty: 60 TABLET | Refills: 5 | Status: SHIPPED | OUTPATIENT
Start: 2025-05-05

## 2025-05-06 ENCOUNTER — HOSPITAL ENCOUNTER (OUTPATIENT)
Dept: RADIOLOGY | Facility: HOSPITAL | Age: 70
Discharge: HOME | End: 2025-05-06
Payer: MEDICARE

## 2025-05-06 DIAGNOSIS — I26.99 BILATERAL PULMONARY EMBOLISM (MULTI): ICD-10-CM

## 2025-05-06 PROCEDURE — 93970 EXTREMITY STUDY: CPT

## 2025-05-08 ENCOUNTER — TELEPHONE (OUTPATIENT)
Dept: HEMATOLOGY/ONCOLOGY | Facility: HOSPITAL | Age: 70
End: 2025-05-08
Payer: MEDICARE

## 2025-05-08 NOTE — TELEPHONE ENCOUNTER
He needs to hold Eliquis 48 hours before the IVC removal please per Dr. Gold staff message.     Patient scheduled for removal on May 14, 2025.        Contacted patient, verbalized understanding. He will hold his Eliquis on May 12, 13, and the morning of procedure.

## 2025-05-14 ENCOUNTER — APPOINTMENT (OUTPATIENT)
Dept: CARDIOLOGY | Facility: HOSPITAL | Age: 70
End: 2025-05-14
Payer: MEDICARE

## 2025-06-28 DIAGNOSIS — I26.99 BILATERAL PULMONARY EMBOLISM (MULTI): ICD-10-CM

## 2025-06-28 DIAGNOSIS — Z09 ENCOUNTER FOR FOLLOW-UP EXAMINATION AFTER COMPLETED TREATMENT FOR CONDITIONS OTHER THAN MALIGNANT NEOPLASM: ICD-10-CM

## 2025-07-01 ENCOUNTER — TELEPHONE (OUTPATIENT)
Dept: HEMATOLOGY/ONCOLOGY | Facility: HOSPITAL | Age: 70
End: 2025-07-01
Payer: MEDICARE

## 2025-07-01 NOTE — TELEPHONE ENCOUNTER
Reason for Conversation  Appointment    Background   Patient was scheduled for a HEM ONC ESTABLISHED PATIENT VISIT with Dr. Gold on Wednesday, 7/9/25 at 2 PM. Received attached [facility] cancellation request indicating the patient would like to cancel and continue care at [facility]. Contacted patient directly to confirm the cancellation and offer to reschedule if needed. Patient declined rescheduling, stating he will continue care at [facility]. Follow-up is no longer needed at this time. Appointment cancelled as requested. Patient verbalized understanding and agreement with the information discussed during the call.    Secure chat sent to nurse partners to notify PCP and/or referring provider of transition in care.

## 2025-07-01 NOTE — TELEPHONE ENCOUNTER
Edward Andrade would like to cancel the following appointments:     Marco Olguin in [facility] ([phone]), 7/9/2025  2:00 PM     Comments:  [facility] asked about my follow up reference my issues so I will be going with them.   Thank You for the help.   Jefferson

## 2025-07-09 ENCOUNTER — APPOINTMENT (OUTPATIENT)
Dept: HEMATOLOGY/ONCOLOGY | Facility: HOSPITAL | Age: 70
End: 2025-07-09
Payer: MEDICARE

## 2025-08-05 DIAGNOSIS — I48.0 AF (PAROXYSMAL ATRIAL FIBRILLATION) (MULTI): ICD-10-CM

## 2025-08-05 RX ORDER — METOPROLOL TARTRATE 100 MG/1
100 TABLET ORAL 2 TIMES DAILY
Qty: 200 TABLET | Refills: 0 | Status: SHIPPED | OUTPATIENT
Start: 2025-08-05

## 2025-09-16 ENCOUNTER — APPOINTMENT (OUTPATIENT)
Dept: PRIMARY CARE | Facility: CLINIC | Age: 70
End: 2025-09-16
Payer: MEDICARE